# Patient Record
Sex: MALE | Race: BLACK OR AFRICAN AMERICAN | NOT HISPANIC OR LATINO | Employment: FULL TIME | URBAN - METROPOLITAN AREA
[De-identification: names, ages, dates, MRNs, and addresses within clinical notes are randomized per-mention and may not be internally consistent; named-entity substitution may affect disease eponyms.]

---

## 2023-07-03 ENCOUNTER — OFFICE VISIT (OUTPATIENT)
Dept: PRIMARY CARE | Facility: CLINIC | Age: 38
End: 2023-07-03
Payer: COMMERCIAL

## 2023-07-03 VITALS
DIASTOLIC BLOOD PRESSURE: 80 MMHG | HEIGHT: 72 IN | OXYGEN SATURATION: 96 % | HEART RATE: 68 BPM | SYSTOLIC BLOOD PRESSURE: 130 MMHG | WEIGHT: 303 LBS | BODY MASS INDEX: 41.04 KG/M2

## 2023-07-03 DIAGNOSIS — J45.909 MODERATE ASTHMA, UNSPECIFIED WHETHER COMPLICATED, UNSPECIFIED WHETHER PERSISTENT (HHS-HCC): ICD-10-CM

## 2023-07-03 DIAGNOSIS — L30.9 ECZEMA, UNSPECIFIED TYPE: ICD-10-CM

## 2023-07-03 DIAGNOSIS — R20.2 PARESTHESIA: ICD-10-CM

## 2023-07-03 DIAGNOSIS — Z82.49 FAMILY HISTORY OF CHF (CONGESTIVE HEART FAILURE): ICD-10-CM

## 2023-07-03 DIAGNOSIS — Z00.00 ANNUAL PHYSICAL EXAM: Primary | ICD-10-CM

## 2023-07-03 PROBLEM — J30.9 ALLERGIC RHINITIS: Status: ACTIVE | Noted: 2023-07-03

## 2023-07-03 PROCEDURE — 99395 PREV VISIT EST AGE 18-39: CPT | Performed by: INTERNAL MEDICINE

## 2023-07-03 PROCEDURE — 1036F TOBACCO NON-USER: CPT | Performed by: INTERNAL MEDICINE

## 2023-07-03 RX ORDER — MOMETASONE FUROATE AND FORMOTEROL FUMARATE DIHYDRATE 200; 5 UG/1; UG/1
2 AEROSOL RESPIRATORY (INHALATION)
Qty: 13 G | Refills: 2 | Status: SHIPPED | OUTPATIENT
Start: 2023-07-03 | End: 2023-12-01 | Stop reason: SDUPTHER

## 2023-07-03 RX ORDER — MOMETASONE FUROATE AND FORMOTEROL FUMARATE DIHYDRATE 200; 5 UG/1; UG/1
2 AEROSOL RESPIRATORY (INHALATION)
COMMUNITY
Start: 2019-07-01 | End: 2023-07-03 | Stop reason: SDUPTHER

## 2023-07-03 RX ORDER — ALBUTEROL SULFATE 90 UG/1
2 AEROSOL, METERED RESPIRATORY (INHALATION) EVERY 6 HOURS PRN
COMMUNITY
Start: 2018-11-08 | End: 2023-12-01 | Stop reason: SDUPTHER

## 2023-07-03 RX ORDER — TRIAMCINOLONE ACETONIDE 1 MG/G
OINTMENT TOPICAL DAILY PRN
Qty: 15 G | Refills: 0 | Status: SHIPPED | OUTPATIENT
Start: 2023-07-03 | End: 2023-12-01 | Stop reason: SDUPTHER

## 2023-07-03 RX ORDER — IPRATROPIUM BROMIDE AND ALBUTEROL SULFATE 2.5; .5 MG/3ML; MG/3ML
3 SOLUTION RESPIRATORY (INHALATION) EVERY 4 HOURS PRN
COMMUNITY
Start: 2018-08-13 | End: 2023-07-03 | Stop reason: SDUPTHER

## 2023-07-03 RX ORDER — IPRATROPIUM BROMIDE AND ALBUTEROL SULFATE 2.5; .5 MG/3ML; MG/3ML
3 SOLUTION RESPIRATORY (INHALATION) EVERY 4 HOURS PRN
Qty: 180 ML | Refills: 1 | Status: SHIPPED | OUTPATIENT
Start: 2023-07-03 | End: 2023-12-01 | Stop reason: SDUPTHER

## 2023-07-03 ASSESSMENT — PATIENT HEALTH QUESTIONNAIRE - PHQ9
1. LITTLE INTEREST OR PLEASURE IN DOING THINGS: NOT AT ALL
SUM OF ALL RESPONSES TO PHQ9 QUESTIONS 1 AND 2: 0
2. FEELING DOWN, DEPRESSED OR HOPELESS: NOT AT ALL

## 2023-07-03 NOTE — PROGRESS NOTES
"Subjective   Patient ID: Elana Myers is a 37 y.o. male who presents for Annual Exam (Physical).      Review of Systems  Skin rash, hands  Tingling arms    Objective   /80   Pulse 68   Ht 1.831 m (6' 0.08\")   Wt 137 kg (303 lb)   SpO2 96%   BMI 41.01 kg/m²     Physical Exam  NAD. Cooperative.  Skin: scattered erythematous patches, dry, scaly skin, both hands,  HEENT: WNL  Neck: WNL  Lungs CTA  Heart: RRR  Abdomen: WNL  Musculoskeletal system: WNL  Neurologic exam: WNL    Assessment/Plan   Diagnoses and all orders for this visit:  Annual physical exam  -     Lipid panel; Future  -     TSH; Future  -     Hemoglobin A1C; Future  -     Comprehensive metabolic panel; Future  -     CBC; Future  Paresthesia  -     Referral to Neurology; Future  Family history of CHF (congestive heart failure)  -     High sensitivity CRP; Future  Eczema, unspecified type  -     triamcinolone (Kenalog) 0.1 % ointment; Apply topically once daily as needed for irritation or rash.  Moderate asthma, unspecified whether complicated, unspecified whether persistent  -     ipratropium-albuteroL (Duo-Neb) 0.5-2.5 mg/3 mL nebulizer solution; Take 3 mL by nebulization every 4 hours if needed for shortness of breath or wheezing.  -     mometasone-formoterol (Dulera) 200-5 mcg/actuation inhaler; Inhale 2 puffs 2 times a day.  -     Home nebulizer         "

## 2023-12-01 ENCOUNTER — TELEPHONE (OUTPATIENT)
Dept: PRIMARY CARE | Facility: CLINIC | Age: 38
End: 2023-12-01

## 2023-12-01 ENCOUNTER — OFFICE VISIT (OUTPATIENT)
Dept: PRIMARY CARE | Facility: CLINIC | Age: 38
End: 2023-12-01
Payer: COMMERCIAL

## 2023-12-01 VITALS
DIASTOLIC BLOOD PRESSURE: 80 MMHG | BODY MASS INDEX: 38.48 KG/M2 | HEART RATE: 85 BPM | HEIGHT: 74 IN | WEIGHT: 299.8 LBS | SYSTOLIC BLOOD PRESSURE: 130 MMHG | OXYGEN SATURATION: 97 %

## 2023-12-01 DIAGNOSIS — J45.909 UNCOMPLICATED ASTHMA, UNSPECIFIED ASTHMA SEVERITY, UNSPECIFIED WHETHER PERSISTENT (HHS-HCC): Primary | ICD-10-CM

## 2023-12-01 DIAGNOSIS — Z82.49 FAMILY HISTORY OF CHF (CONGESTIVE HEART FAILURE): ICD-10-CM

## 2023-12-01 DIAGNOSIS — D72.829 LEUKOCYTOSIS, UNSPECIFIED TYPE: ICD-10-CM

## 2023-12-01 DIAGNOSIS — L30.9 ECZEMA, UNSPECIFIED TYPE: ICD-10-CM

## 2023-12-01 DIAGNOSIS — Z00.00 ANNUAL PHYSICAL EXAM: ICD-10-CM

## 2023-12-01 DIAGNOSIS — J45.901 EXACERBATION OF ASTHMA, UNSPECIFIED ASTHMA SEVERITY, UNSPECIFIED WHETHER PERSISTENT (HHS-HCC): Primary | ICD-10-CM

## 2023-12-01 DIAGNOSIS — J45.909 MODERATE ASTHMA, UNSPECIFIED WHETHER COMPLICATED, UNSPECIFIED WHETHER PERSISTENT (HHS-HCC): ICD-10-CM

## 2023-12-01 DIAGNOSIS — M65.4 RADIAL STYLOID TENOSYNOVITIS: Primary | ICD-10-CM

## 2023-12-01 PROBLEM — E66.9 OBESITY (BMI 30-39.9): Status: ACTIVE | Noted: 2023-12-01

## 2023-12-01 PROBLEM — Z28.21 INFLUENZA VACCINE REFUSED: Status: ACTIVE | Noted: 2023-12-01

## 2023-12-01 LAB
ALBUMIN SERPL BCP-MCNC: 4.3 G/DL (ref 3.4–5)
ALP SERPL-CCNC: 45 U/L (ref 33–120)
ALT SERPL W P-5'-P-CCNC: 25 U/L (ref 10–52)
ANION GAP SERPL CALC-SCNC: 12 MMOL/L (ref 10–20)
AST SERPL W P-5'-P-CCNC: 18 U/L (ref 9–39)
BILIRUB SERPL-MCNC: 0.4 MG/DL (ref 0–1.2)
BUN SERPL-MCNC: 11 MG/DL (ref 6–23)
CALCIUM SERPL-MCNC: 9.5 MG/DL (ref 8.6–10.6)
CHLORIDE SERPL-SCNC: 105 MMOL/L (ref 98–107)
CHOLEST SERPL-MCNC: 234 MG/DL (ref 0–199)
CHOLESTEROL/HDL RATIO: 4.7
CO2 SERPL-SCNC: 24 MMOL/L (ref 21–32)
CREAT SERPL-MCNC: 1.05 MG/DL (ref 0.5–1.3)
CRP SERPL HS-MCNC: 2.9 MG/L
ERYTHROCYTE [DISTWIDTH] IN BLOOD BY AUTOMATED COUNT: 14.3 % (ref 11.5–14.5)
EST. AVERAGE GLUCOSE BLD GHB EST-MCNC: 105 MG/DL
GFR SERPL CREATININE-BSD FRML MDRD: >90 ML/MIN/1.73M*2
GLUCOSE SERPL-MCNC: 76 MG/DL (ref 74–99)
HBA1C MFR BLD: 5.3 %
HCT VFR BLD AUTO: 45.4 % (ref 41–52)
HDLC SERPL-MCNC: 49.6 MG/DL
HGB BLD-MCNC: 14.8 G/DL (ref 13.5–17.5)
LDLC SERPL CALC-MCNC: 166 MG/DL
MCH RBC QN AUTO: 28.7 PG (ref 26–34)
MCHC RBC AUTO-ENTMCNC: 32.6 G/DL (ref 32–36)
MCV RBC AUTO: 88 FL (ref 80–100)
NON HDL CHOLESTEROL: 184 MG/DL (ref 0–149)
NRBC BLD-RTO: 0 /100 WBCS (ref 0–0)
PLATELET # BLD AUTO: 221 X10*3/UL (ref 150–450)
POTASSIUM SERPL-SCNC: 3.9 MMOL/L (ref 3.5–5.3)
PROT SERPL-MCNC: 6.7 G/DL (ref 6.4–8.2)
RBC # BLD AUTO: 5.16 X10*6/UL (ref 4.5–5.9)
SODIUM SERPL-SCNC: 137 MMOL/L (ref 136–145)
TRIGL SERPL-MCNC: 90 MG/DL (ref 0–149)
TSH SERPL-ACNC: 1.21 MIU/L (ref 0.44–3.98)
VLDL: 18 MG/DL (ref 0–40)
WBC # BLD AUTO: 15.8 X10*3/UL (ref 4.4–11.3)

## 2023-12-01 PROCEDURE — 1036F TOBACCO NON-USER: CPT | Performed by: INTERNAL MEDICINE

## 2023-12-01 PROCEDURE — 80053 COMPREHEN METABOLIC PANEL: CPT

## 2023-12-01 PROCEDURE — 83036 HEMOGLOBIN GLYCOSYLATED A1C: CPT

## 2023-12-01 PROCEDURE — 80061 LIPID PANEL: CPT

## 2023-12-01 PROCEDURE — 85027 COMPLETE CBC AUTOMATED: CPT

## 2023-12-01 PROCEDURE — 36415 COLL VENOUS BLD VENIPUNCTURE: CPT

## 2023-12-01 PROCEDURE — 84443 ASSAY THYROID STIM HORMONE: CPT

## 2023-12-01 PROCEDURE — 86141 C-REACTIVE PROTEIN HS: CPT

## 2023-12-01 PROCEDURE — 99214 OFFICE O/P EST MOD 30 MIN: CPT | Performed by: INTERNAL MEDICINE

## 2023-12-01 RX ORDER — TRIAMCINOLONE ACETONIDE 1 MG/G
OINTMENT TOPICAL DAILY PRN
Qty: 15 G | Refills: 0 | Status: SHIPPED | OUTPATIENT
Start: 2023-12-01 | End: 2024-11-30

## 2023-12-01 RX ORDER — IPRATROPIUM BROMIDE AND ALBUTEROL SULFATE 2.5; .5 MG/3ML; MG/3ML
3 SOLUTION RESPIRATORY (INHALATION) EVERY 4 HOURS PRN
Qty: 180 ML | Refills: 1 | Status: SHIPPED | OUTPATIENT
Start: 2023-12-01

## 2023-12-01 RX ORDER — MOMETASONE FUROATE AND FORMOTEROL FUMARATE DIHYDRATE 200; 5 UG/1; UG/1
2 AEROSOL RESPIRATORY (INHALATION)
Qty: 13 G | Refills: 2 | Status: SHIPPED | OUTPATIENT
Start: 2023-12-01 | End: 2023-12-05 | Stop reason: SDUPTHER

## 2023-12-01 RX ORDER — MELOXICAM 7.5 MG/1
7.51-15 TABLET ORAL DAILY PRN
Qty: 30 TABLET | Refills: 0 | Status: SHIPPED | OUTPATIENT
Start: 2023-12-01

## 2023-12-01 RX ORDER — ALBUTEROL SULFATE 90 UG/1
2 AEROSOL, METERED RESPIRATORY (INHALATION) EVERY 6 HOURS PRN
Qty: 18 G | Refills: 2 | Status: SHIPPED | OUTPATIENT
Start: 2023-12-01

## 2023-12-01 NOTE — TELEPHONE ENCOUNTER
Pt. Is requesting a nebulizer, his old one broke. He will use [facility] for the nebulizer. This pharmacy is now on file.

## 2023-12-01 NOTE — PROGRESS NOTES
"Subjective   Patient ID: JUSTINE Myers is a 38 y.o. male who presents for Med Refill, thumb (Stiffness and pain and swelling), Asthma (Flare-up), and test (Needs blood work).    HPI   C/O right thumb pain for 12 months, right hand dominant, uses the mouse with computer work every day for multiple hours.  Asthma medications refills request.  Review of Systems  Right thumb pain  Objective   Pulse 85   Ht 1.88 m (6' 2\")   Wt 136 kg (299 lb 12.8 oz)   SpO2 97%   BMI 38.49 kg/m²     Physical Exam  NAD. Cooperative.  HEENT: WNL  Lungs CTA  Heart: RRR  Musculoskeletal system: marked point tenderness over the right radial styloid process.  Neurologic exam: WNL    Assessment/Plan   Diagnoses and all orders for this visit:  Radial styloid tenosynovitis  -     meloxicam (Mobic) 7.5 mg tablet; Take 1-2 tablets (7.51-15 mg) by mouth once daily as needed for moderate pain (4 - 6).  Moderate asthma, unspecified whether complicated, unspecified whether persistent  -     ipratropium-albuteroL (Duo-Neb) 0.5-2.5 mg/3 mL nebulizer solution; Take 3 mL by nebulization every 4 hours if needed for shortness of breath or wheezing.  -     mometasone-formoterol (Dulera) 200-5 mcg/actuation inhaler; Inhale 2 puffs 2 times a day.  -     albuterol (ProAir HFA) 90 mcg/actuation inhaler; Inhale 2 puffs every 6 hours if needed for wheezing or shortness of breath.  Eczema, unspecified type  -     triamcinolone (Kenalog) 0.1 % ointment; Apply topically once daily as needed for irritation or rash.  Immobilization with thumb guard, icing 15 minutes per hour, as frequently as possible  Influenza vaccine was discussed and recommended, the patient declined.  Lab ordered in 7/23.     "

## 2023-12-05 ENCOUNTER — OFFICE VISIT (OUTPATIENT)
Dept: NEUROLOGY | Facility: CLINIC | Age: 38
End: 2023-12-05
Payer: COMMERCIAL

## 2023-12-05 ENCOUNTER — ANCILLARY PROCEDURE (OUTPATIENT)
Dept: RADIOLOGY | Facility: CLINIC | Age: 38
End: 2023-12-05
Payer: COMMERCIAL

## 2023-12-05 VITALS
SYSTOLIC BLOOD PRESSURE: 148 MMHG | TEMPERATURE: 97.7 F | WEIGHT: 311 LBS | HEART RATE: 74 BPM | DIASTOLIC BLOOD PRESSURE: 77 MMHG | HEIGHT: 74 IN | BODY MASS INDEX: 39.91 KG/M2

## 2023-12-05 DIAGNOSIS — J45.901 EXACERBATION OF ASTHMA, UNSPECIFIED ASTHMA SEVERITY, UNSPECIFIED WHETHER PERSISTENT (HHS-HCC): ICD-10-CM

## 2023-12-05 DIAGNOSIS — J45.909 MODERATE ASTHMA, UNSPECIFIED WHETHER COMPLICATED, UNSPECIFIED WHETHER PERSISTENT (HHS-HCC): ICD-10-CM

## 2023-12-05 DIAGNOSIS — M25.511 CHRONIC RIGHT SHOULDER PAIN: ICD-10-CM

## 2023-12-05 DIAGNOSIS — G56.23 ULNAR NEUROPATHY OF BOTH UPPER EXTREMITIES: Primary | ICD-10-CM

## 2023-12-05 DIAGNOSIS — R20.2 PARESTHESIA: ICD-10-CM

## 2023-12-05 DIAGNOSIS — G89.29 CHRONIC RIGHT SHOULDER PAIN: ICD-10-CM

## 2023-12-05 DIAGNOSIS — G57.00 SCIATIC NEUROPATHY, UNSPECIFIED LATERALITY: ICD-10-CM

## 2023-12-05 PROCEDURE — 99205 OFFICE O/P NEW HI 60 MIN: CPT | Performed by: PSYCHIATRY & NEUROLOGY

## 2023-12-05 PROCEDURE — 85025 COMPLETE CBC W/AUTO DIFF WBC: CPT

## 2023-12-05 PROCEDURE — 71046 X-RAY EXAM CHEST 2 VIEWS: CPT | Performed by: RADIOLOGY

## 2023-12-05 PROCEDURE — 71046 X-RAY EXAM CHEST 2 VIEWS: CPT | Mod: FY

## 2023-12-05 PROCEDURE — 1036F TOBACCO NON-USER: CPT | Performed by: PSYCHIATRY & NEUROLOGY

## 2023-12-05 RX ORDER — MOMETASONE FUROATE AND FORMOTEROL FUMARATE DIHYDRATE 200; 5 UG/1; UG/1
2 AEROSOL RESPIRATORY (INHALATION)
Qty: 13 G | Refills: 2 | Status: SHIPPED | OUTPATIENT
Start: 2023-12-05 | End: 2024-12-04

## 2023-12-05 NOTE — PROGRESS NOTES
NEUROLOGY OUTPATIENT INITIAL VISIT NOTE    Assessment/Plan   Diagnoses and all orders for this visit:  Ulnar neuropathy of both upper extremities  Sciatic neuropathy, unspecified laterality  Chronic right shoulder pain      IMPRESSION:  Bilateral mild ulnar neuropathy by symptoms, though no exam findings at this time.  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  Non-neurological right shoulder pain.    PLAN:  Advised to change his workspace for better elbow support, padding on chair, and regular standing breaks while at work.  Referred back to PCP for shoulder issue.  I am happy to see him again as needed or as requested.    Estrada Flynn Jr., M.D., FAAN       --------      Subjective     Elana Myers is a 38 y.o. year old, right-handed male referred by Dr. Ellison for tingling and shoulder pain.    HPI  He reports about a year of tingling of the elbows to the ring and pinky fingers bilaterally,  and on both posterior thighs.  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  He places the arms and elbows on the arm rests.  These issues can appear separately.  He denies other focal neurological symptoms including dysarthria, dysphagia, diplopia, focal weakness, other focal sensory change, ataxia, vertigo, or bowel/bladder incontinence, among others.      He reports nine years of right shoulder discomfort.  It is sharp pain radiating from the medial posterior shoulder down and around the shoulder blade.  It only appears if he is pulling something from above him while exercising.    Review of Systems   All other systems reviewed and are negative.      Patient Active Problem List   Diagnosis    Allergic rhinitis    Asthma    Obesity (BMI 30-39.9)    Influenza vaccine refused     Past Medical History:   Diagnosis Date    Personal history of nicotine dependence 10/05/2020    History of nicotine dependence     No past surgical history on file.  Social History     Tobacco  "Use    Smoking status: Never    Smokeless tobacco: Never   Substance Use Topics    Alcohol use: Yes     Comment: 2-3 drinks per month     family history is not on file.    Current Outpatient Medications:     albuterol (ProAir HFA) 90 mcg/actuation inhaler, Inhale 2 puffs every 6 hours if needed for wheezing or shortness of breath., Disp: 18 g, Rfl: 2    ipratropium-albuteroL (Duo-Neb) 0.5-2.5 mg/3 mL nebulizer solution, Take 3 mL by nebulization every 4 hours if needed for shortness of breath or wheezing., Disp: 180 mL, Rfl: 1    meloxicam (Mobic) 7.5 mg tablet, Take 1-2 tablets (7.51-15 mg) by mouth once daily as needed for moderate pain (4 - 6)., Disp: 30 tablet, Rfl: 0    mometasone-formoterol (Dulera) 200-5 mcg/actuation inhaler, Inhale 2 puffs 2 times a day., Disp: 13 g, Rfl: 2    triamcinolone (Kenalog) 0.1 % ointment, Apply topically once daily as needed for irritation or rash., Disp: 15 g, Rfl: 0  Allergies   Allergen Reactions    Penicillins Anaphylaxis    Sulfa (Sulfonamide Antibiotics) Anaphylaxis and Hives       Objective     /77   Pulse 74   Temp 36.5 °C (97.7 °F)   Ht 1.88 m (6' 2\")   Wt 141 kg (311 lb)   BMI 39.93 kg/m²     CONSTITUTIONAL:  No acute distress    EXTREMITIES:  No right shoulder tenderness, full range of motion    CARDIOVASCULAR:  Normal pulses in the distal legs, no edema of either arm or either leg.  No carotid bruits.    MENTAL STATUS:  Awake, alert, fully oriented to self, place, and time, with present short-term memory, good awareness of recent events, normal attention span, concentration, and fund of knowledge.    SPEECH AND LANGUAGE:  Can name and repeat, follows all commands, has no dysarthria    FUNDOSCOPIC:  No papilledema    CRANIAL NERVES:  II-Vision present, visual fields full to confrontational testing    III/IV/VI--EOMs are present in all directions.  Pupils are symmetrically reactive in dim light.  No ptosis.    V--Normal facial sensation.    VII--No facial " asymmetry.    VIII--Hearing present to voice bilaterally.    IX/X--Symmetric soft palate rise.    XI--Normal trapezius power bilaterally.    XII--Tongue protrudes without deviation.    MOTOR:  Normal power, tone, and bulk in both arms and both legs.  Specifically, no atrophy of the shoulder muscles on either side.    SENSORY:  Normal pin sensation in both arms and both legs without distal-proximal gradient, asymmetry, or spinal sensory level.  No Tinel's at either wrist or either elbow.    COORDINATION:  Normal finger-to-nose and heel-to-shin testing in both arms and both legs.    REFLEXES are normal and symmetric at the biceps, triceps, brachioradialis, patella, and ankle.  The plantar responses are flexor.    GAIT is normal, without steppage, ataxia, shuffling, or spasticity.        Estrada Flynn Jr., M.D., [facility]

## 2023-12-05 NOTE — LETTER
December 5, 2023     Marylu Ellison MD  [address]  [address]  [facility] [address]    Patient: JUSTINE Myers   YOB: 1985   Date of Visit: 12/5/2023       Dear Dr. Marylu Ellison MD:    Thank you for referring JUSTINE Myers to me for evaluation. Below are my notes for this consultation.  If you have questions, please do not hesitate to call me. I look forward to following your patient along with you.       Sincerely,     Estrada Flynn Jr., M.D., FAAN       CC: No Recipients  ______________________________________________________________________________________    NEUROLOGY OUTPATIENT INITIAL VISIT NOTE    Assessment/Plan  Diagnoses and all orders for this visit:  Ulnar neuropathy of both upper extremities  Sciatic neuropathy, unspecified laterality  Chronic right shoulder pain      IMPRESSION:  Bilateral mild ulnar neuropathy by symptoms, though no exam findings at this time.  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  Non-neurological right shoulder pain.    PLAN:  Advised to change his workspace for better elbow support, padding on chair, and regular standing breaks while at work.  Referred back to PCP for shoulder issue.  I am happy to see him again as needed or as requested.    Estrada Flynn Jr., M.D., FAAN       --------      Subjective    Elana Myers is a 38 y.o. year old, right-handed male referred by Dr. Ellison for tingling and shoulder pain.    HPI  He reports about a year of tingling of the elbows to the ring and pinky fingers bilaterally,  and on both posterior thighs.  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  He places the arms and elbows on the arm rests.  These issues can appear separately.  He denies other focal neurological symptoms including dysarthria, dysphagia, diplopia, focal weakness, other focal sensory change, ataxia, vertigo, or bowel/bladder incontinence, among others.      He reports nine years of  "right shoulder discomfort.  It is sharp pain radiating from the medial posterior shoulder down and around the shoulder blade.  It only appears if he is pulling something from above him while exercising.    Review of Systems   All other systems reviewed and are negative.      Patient Active Problem List   Diagnosis   • Allergic rhinitis   • Asthma   • Obesity (BMI 30-39.9)   • Influenza vaccine refused     Past Medical History:   Diagnosis Date   • Personal history of nicotine dependence 10/05/2020    History of nicotine dependence     No past surgical history on file.  Social History     Tobacco Use   • Smoking status: Never   • Smokeless tobacco: Never   Substance Use Topics   • Alcohol use: Yes     Comment: 2-3 drinks per month     family history is not on file.    Current Outpatient Medications:   •  albuterol (ProAir HFA) 90 mcg/actuation inhaler, Inhale 2 puffs every 6 hours if needed for wheezing or shortness of breath., Disp: 18 g, Rfl: 2  •  ipratropium-albuteroL (Duo-Neb) 0.5-2.5 mg/3 mL nebulizer solution, Take 3 mL by nebulization every 4 hours if needed for shortness of breath or wheezing., Disp: 180 mL, Rfl: 1  •  meloxicam (Mobic) 7.5 mg tablet, Take 1-2 tablets (7.51-15 mg) by mouth once daily as needed for moderate pain (4 - 6)., Disp: 30 tablet, Rfl: 0  •  mometasone-formoterol (Dulera) 200-5 mcg/actuation inhaler, Inhale 2 puffs 2 times a day., Disp: 13 g, Rfl: 2  •  triamcinolone (Kenalog) 0.1 % ointment, Apply topically once daily as needed for irritation or rash., Disp: 15 g, Rfl: 0  Allergies   Allergen Reactions   • Penicillins Anaphylaxis   • Sulfa (Sulfonamide Antibiotics) Anaphylaxis and Hives       Objective    /77   Pulse 74   Temp 36.5 °C (97.7 °F)   Ht 1.88 m (6' 2\")   Wt 141 kg (311 lb)   BMI 39.93 kg/m²     CONSTITUTIONAL:  No acute distress    EXTREMITIES:  No right shoulder tenderness, full range of motion    CARDIOVASCULAR:  Normal pulses in the distal legs, no edema of " either arm or either leg.  No carotid bruits.    MENTAL STATUS:  Awake, alert, fully oriented to self, place, and time, with present short-term memory, good awareness of recent events, normal attention span, concentration, and fund of knowledge.    SPEECH AND LANGUAGE:  Can name and repeat, follows all commands, has no dysarthria    FUNDOSCOPIC:  No papilledema    CRANIAL NERVES:  II-Vision present, visual fields full to confrontational testing    III/IV/VI--EOMs are present in all directions.  Pupils are symmetrically reactive in dim light.  No ptosis.    V--Normal facial sensation.    VII--No facial asymmetry.    VIII--Hearing present to voice bilaterally.    IX/X--Symmetric soft palate rise.    XI--Normal trapezius power bilaterally.    XII--Tongue protrudes without deviation.    MOTOR:  Normal power, tone, and bulk in both arms and both legs.  Specifically, no atrophy of the shoulder muscles on either side.    SENSORY:  Normal pin sensation in both arms and both legs without distal-proximal gradient, asymmetry, or spinal sensory level.  No Tinel's at either wrist or either elbow.    COORDINATION:  Normal finger-to-nose and heel-to-shin testing in both arms and both legs.    REFLEXES are normal and symmetric at the biceps, triceps, brachioradialis, patella, and ankle.  The plantar responses are flexor.    GAIT is normal, without steppage, ataxia, shuffling, or spasticity.        Estrada Flynn Jr., M.D., FAAN

## 2023-12-07 ENCOUNTER — TELEPHONE (OUTPATIENT)
Dept: PRIMARY CARE | Facility: CLINIC | Age: 38
End: 2023-12-07
Payer: COMMERCIAL

## 2023-12-07 DIAGNOSIS — G89.29 CHRONIC RIGHT SHOULDER PAIN: Primary | ICD-10-CM

## 2023-12-07 DIAGNOSIS — M25.511 CHRONIC RIGHT SHOULDER PAIN: Primary | ICD-10-CM

## 2024-08-13 ENCOUNTER — APPOINTMENT (OUTPATIENT)
Dept: RADIOLOGY | Facility: HOSPITAL | Age: 39
End: 2024-08-13
Payer: COMMERCIAL

## 2024-08-13 ENCOUNTER — HOSPITAL ENCOUNTER (EMERGENCY)
Facility: HOSPITAL | Age: 39
Discharge: HOME | End: 2024-08-13
Attending: EMERGENCY MEDICINE
Payer: COMMERCIAL

## 2024-08-13 VITALS
WEIGHT: 300 LBS | HEIGHT: 75 IN | OXYGEN SATURATION: 100 % | TEMPERATURE: 98.4 F | DIASTOLIC BLOOD PRESSURE: 70 MMHG | HEART RATE: 67 BPM | SYSTOLIC BLOOD PRESSURE: 107 MMHG | BODY MASS INDEX: 37.3 KG/M2 | RESPIRATION RATE: 18 BRPM

## 2024-08-13 DIAGNOSIS — A08.4 VIRAL GASTROENTERITIS: Primary | ICD-10-CM

## 2024-08-13 LAB
ALBUMIN SERPL BCP-MCNC: 4.2 G/DL (ref 3.4–5)
ALP SERPL-CCNC: 48 U/L (ref 33–120)
ALT SERPL W P-5'-P-CCNC: 20 U/L (ref 10–52)
ANION GAP BLDV CALCULATED.4IONS-SCNC: 8 MMOL/L (ref 10–25)
ANION GAP SERPL CALC-SCNC: 14 MMOL/L (ref 10–20)
APPEARANCE UR: CLEAR
AST SERPL W P-5'-P-CCNC: 19 U/L (ref 9–39)
BASE EXCESS BLDV CALC-SCNC: -0.1 MMOL/L (ref -2–3)
BASOPHILS # BLD AUTO: 0.02 X10*3/UL (ref 0–0.1)
BASOPHILS NFR BLD AUTO: 0.3 %
BILIRUB DIRECT SERPL-MCNC: 0.1 MG/DL (ref 0–0.3)
BILIRUB SERPL-MCNC: 0.5 MG/DL (ref 0–1.2)
BILIRUB UR STRIP.AUTO-MCNC: NEGATIVE MG/DL
BODY TEMPERATURE: 37 DEGREES CELSIUS
BUN SERPL-MCNC: 12 MG/DL (ref 6–23)
CA-I BLDV-SCNC: 1.2 MMOL/L (ref 1.1–1.33)
CALCIUM SERPL-MCNC: 8.8 MG/DL (ref 8.6–10.3)
CHLORIDE BLDV-SCNC: 105 MMOL/L (ref 98–107)
CHLORIDE SERPL-SCNC: 106 MMOL/L (ref 98–107)
CO2 SERPL-SCNC: 20 MMOL/L (ref 21–32)
COLOR UR: NORMAL
CREAT SERPL-MCNC: 1.14 MG/DL (ref 0.5–1.3)
EGFRCR SERPLBLD CKD-EPI 2021: 84 ML/MIN/1.73M*2
EOSINOPHIL # BLD AUTO: 0.03 X10*3/UL (ref 0–0.7)
EOSINOPHIL NFR BLD AUTO: 0.5 %
ERYTHROCYTE [DISTWIDTH] IN BLOOD BY AUTOMATED COUNT: 13.6 % (ref 11.5–14.5)
GLUCOSE BLDV-MCNC: 108 MG/DL (ref 74–99)
GLUCOSE SERPL-MCNC: 107 MG/DL (ref 74–99)
GLUCOSE UR STRIP.AUTO-MCNC: NORMAL MG/DL
HCO3 BLDV-SCNC: 22.6 MMOL/L (ref 22–26)
HCT VFR BLD AUTO: 45.2 % (ref 41–52)
HCT VFR BLD EST: 47 % (ref 41–52)
HGB BLD-MCNC: 15.6 G/DL (ref 13.5–17.5)
HGB BLDV-MCNC: 15.8 G/DL (ref 13.5–17.5)
IMM GRANULOCYTES # BLD AUTO: 0.01 X10*3/UL (ref 0–0.7)
IMM GRANULOCYTES NFR BLD AUTO: 0.2 % (ref 0–0.9)
INHALED O2 CONCENTRATION: 21 %
KETONES UR STRIP.AUTO-MCNC: NEGATIVE MG/DL
LACTATE BLDV-SCNC: 1.8 MMOL/L (ref 0.4–2)
LACTATE SERPL-SCNC: 1.3 MMOL/L (ref 0.4–2)
LEUKOCYTE ESTERASE UR QL STRIP.AUTO: NEGATIVE
LIPASE SERPL-CCNC: 57 U/L (ref 9–82)
LYMPHOCYTES # BLD AUTO: 1.58 X10*3/UL (ref 1.2–4.8)
LYMPHOCYTES NFR BLD AUTO: 25.5 %
MCH RBC QN AUTO: 29 PG (ref 26–34)
MCHC RBC AUTO-ENTMCNC: 34.5 G/DL (ref 32–36)
MCV RBC AUTO: 84 FL (ref 80–100)
MONOCYTES # BLD AUTO: 0.45 X10*3/UL (ref 0.1–1)
MONOCYTES NFR BLD AUTO: 7.3 %
NEUTROPHILS # BLD AUTO: 4.1 X10*3/UL (ref 1.2–7.7)
NEUTROPHILS NFR BLD AUTO: 66.2 %
NITRITE UR QL STRIP.AUTO: NEGATIVE
NRBC BLD-RTO: 0 /100 WBCS (ref 0–0)
OXYHGB MFR BLDV: 97.3 % (ref 45–75)
PCO2 BLDV: 31 MM HG (ref 41–51)
PH BLDV: 7.47 PH (ref 7.33–7.43)
PH UR STRIP.AUTO: 7 [PH]
PLATELET # BLD AUTO: 226 X10*3/UL (ref 150–450)
PO2 BLDV: 108 MM HG (ref 35–45)
POTASSIUM BLDV-SCNC: 3.5 MMOL/L (ref 3.5–5.3)
POTASSIUM SERPL-SCNC: 3.6 MMOL/L (ref 3.5–5.3)
PROT SERPL-MCNC: 6.8 G/DL (ref 6.4–8.2)
PROT UR STRIP.AUTO-MCNC: NEGATIVE MG/DL
RBC # BLD AUTO: 5.38 X10*6/UL (ref 4.5–5.9)
RBC # UR STRIP.AUTO: NEGATIVE /UL
SAO2 % BLDV: 99 % (ref 45–75)
SODIUM BLDV-SCNC: 132 MMOL/L (ref 136–145)
SODIUM SERPL-SCNC: 136 MMOL/L (ref 136–145)
SP GR UR STRIP.AUTO: 1.02
UROBILINOGEN UR STRIP.AUTO-MCNC: NORMAL MG/DL
WBC # BLD AUTO: 6.2 X10*3/UL (ref 4.4–11.3)

## 2024-08-13 PROCEDURE — 74177 CT ABD & PELVIS W/CONTRAST: CPT

## 2024-08-13 PROCEDURE — 81003 URINALYSIS AUTO W/O SCOPE: CPT | Performed by: EMERGENCY MEDICINE

## 2024-08-13 PROCEDURE — 96374 THER/PROPH/DIAG INJ IV PUSH: CPT | Performed by: EMERGENCY MEDICINE

## 2024-08-13 PROCEDURE — 74177 CT ABD & PELVIS W/CONTRAST: CPT | Performed by: RADIOLOGY

## 2024-08-13 PROCEDURE — 36415 COLL VENOUS BLD VENIPUNCTURE: CPT | Performed by: EMERGENCY MEDICINE

## 2024-08-13 PROCEDURE — 85025 COMPLETE CBC W/AUTO DIFF WBC: CPT | Performed by: EMERGENCY MEDICINE

## 2024-08-13 PROCEDURE — 96360 HYDRATION IV INFUSION INIT: CPT | Performed by: EMERGENCY MEDICINE

## 2024-08-13 PROCEDURE — 2500000004 HC RX 250 GENERAL PHARMACY W/ HCPCS (ALT 636 FOR OP/ED): Performed by: EMERGENCY MEDICINE

## 2024-08-13 PROCEDURE — 83690 ASSAY OF LIPASE: CPT | Performed by: EMERGENCY MEDICINE

## 2024-08-13 PROCEDURE — 99284 EMERGENCY DEPT VISIT MOD MDM: CPT | Mod: 25 | Performed by: EMERGENCY MEDICINE

## 2024-08-13 PROCEDURE — 83605 ASSAY OF LACTIC ACID: CPT | Mod: 91 | Performed by: EMERGENCY MEDICINE

## 2024-08-13 PROCEDURE — 84132 ASSAY OF SERUM POTASSIUM: CPT | Performed by: EMERGENCY MEDICINE

## 2024-08-13 PROCEDURE — 82248 BILIRUBIN DIRECT: CPT | Performed by: EMERGENCY MEDICINE

## 2024-08-13 PROCEDURE — 96361 HYDRATE IV INFUSION ADD-ON: CPT | Performed by: EMERGENCY MEDICINE

## 2024-08-13 PROCEDURE — 96375 TX/PRO/DX INJ NEW DRUG ADDON: CPT | Performed by: EMERGENCY MEDICINE

## 2024-08-13 PROCEDURE — 84132 ASSAY OF SERUM POTASSIUM: CPT | Mod: 59 | Performed by: EMERGENCY MEDICINE

## 2024-08-13 PROCEDURE — 2550000001 HC RX 255 CONTRASTS: Performed by: EMERGENCY MEDICINE

## 2024-08-13 RX ORDER — ONDANSETRON 4 MG/1
4 TABLET, ORALLY DISINTEGRATING ORAL EVERY 8 HOURS PRN
Qty: 15 TABLET | Refills: 0 | Status: SHIPPED | OUTPATIENT
Start: 2024-08-13

## 2024-08-13 RX ORDER — DICYCLOMINE HYDROCHLORIDE 20 MG/1
20 TABLET ORAL 2 TIMES DAILY
Qty: 20 TABLET | Refills: 0 | Status: SHIPPED | OUTPATIENT
Start: 2024-08-13 | End: 2024-08-23

## 2024-08-13 RX ORDER — ONDANSETRON HYDROCHLORIDE 2 MG/ML
4 INJECTION, SOLUTION INTRAVENOUS ONCE
Status: COMPLETED | OUTPATIENT
Start: 2024-08-13 | End: 2024-08-13

## 2024-08-13 RX ORDER — KETOROLAC TROMETHAMINE 30 MG/ML
30 INJECTION, SOLUTION INTRAMUSCULAR; INTRAVENOUS ONCE
Status: COMPLETED | OUTPATIENT
Start: 2024-08-13 | End: 2024-08-13

## 2024-08-13 ASSESSMENT — PAIN DESCRIPTION - LOCATION: LOCATION: ABDOMEN

## 2024-08-13 ASSESSMENT — COLUMBIA-SUICIDE SEVERITY RATING SCALE - C-SSRS
2. HAVE YOU ACTUALLY HAD ANY THOUGHTS OF KILLING YOURSELF?: NO
6. HAVE YOU EVER DONE ANYTHING, STARTED TO DO ANYTHING, OR PREPARED TO DO ANYTHING TO END YOUR LIFE?: NO
1. IN THE PAST MONTH, HAVE YOU WISHED YOU WERE DEAD OR WISHED YOU COULD GO TO SLEEP AND NOT WAKE UP?: NO

## 2024-08-13 ASSESSMENT — PAIN DESCRIPTION - ORIENTATION: ORIENTATION: MID;UPPER

## 2024-08-13 ASSESSMENT — PAIN - FUNCTIONAL ASSESSMENT: PAIN_FUNCTIONAL_ASSESSMENT: 0-10

## 2024-08-13 ASSESSMENT — PAIN SCALES - GENERAL: PAINLEVEL_OUTOF10: 10 - WORST POSSIBLE PAIN

## 2024-08-13 NOTE — ED PROVIDER NOTES
HPI   Chief Complaint   Patient presents with    Abdominal Pain       HPI: []  39-year-old  male who has no medical history comes in with abdominal pain vomiting diarrhea for the last 5 days.  He states he had some bad food 5 days ago for since that time he has not diarrhea.  About 10 BMs every day.  He vomited once today.  He has abdominal pain which is diffuse.  Cramp-like nature.  He denies hematemesis melena hematochezia no hemoptysis he denied recent travel or hospitalization no antibiotic use.  He denies sick contacts.    Past history: None  Social: Patient denies  current tobacco alcohol drug abuse.  REVIEW OF SYSTEMS:    GENERAL.: No weight loss, fatigue, anorexia, insomnia, fever.    EYES: No vision loss, double vision, drainage, eye pain.    ENT: No pharyngitis, dry mouth.    CARDIOPULMONARY: No chest pain, palpitations, syncope, near syncope. No shortness of breath, cough, hemoptysis.    GI: Positive for abdominal pain, change in bowel habits, melena, hematemesis, hematochezia, nausea, positive for vomiting, positive for diarrhea.    : No discharge, dysuria, frequency, urgency, hematuria.    MS: No limb pain, joint pain, joint swelling.    SKIN: No rashes.    PSYCH: No depression, anxiety, suicidality, homicidality.    Review of systems is otherwise negative unless stated above or in history of present illness.  Social history, family history, allergies reviewed.  PHYSICAL EXAM:    GENERAL: Vitals noted, no distress. Alert and oriented  x 3. Non-toxic.  Appears uncomfortable    EENT: TMs clear. Posterior oropharynx unremarkable. No meningismus. No LAD.     NECK: Supple. Nontender. No midline tenderness.     CARDIAC: Regular, rate, rhythm. No murmurs rubs or gallops. No JVD    PULMONARY: Lungs clear bilaterally with good aeration. No wheezes rales or rhonchi. No respiratory distress.     ABDOMEN: Soft, nonsurgical.  Minimal diffuse tenderness no rebound or guarding negative Low sign negative  McBurney point tenderness no CVA tenderness no inguinal hernias.  Very benign nonsurgical abdomen.  No peritoneal signs. Normoactive bowel sounds. No pulsatile masses.     EXTREMITIES: No peripheral edema. Negative Homans bilaterally, no cords.  2+ bounding pulses well-perfused.    SKIN: No rash. Intact.     NEURO: No focal neurologic deficits, NIH score of 0. Cranial nerves normal as tested from II through XII.     MEDICAL DECISION MAKING:  CBC with differential shows no leukocytosis stable hemoglobin chemistry is unremarkable sodium bicarbonate 20 which is low but anion gap is normal, UA shows no ketones no infection lipase normal LFTs normal lactate normal.  Abdominal CAT scan on my interpretation I do not see any obvious pathology I do not see any diverticulitis appendicitis any bowel obstruction or any evidence of colitis.  Awaiting read by radiology.    Treatment in ED: IV established given IV ketorolac Zofran and 2 L of LR.    ED course: Repeat assessment feeling much better passed a p.o. challenge enmanuel afebrile normotensive no tachycardia hypoxia, repeat exam is benign nonlocalizing.    Impression: #1 acute gastroenteritis most likely viral    Plan/MDM: 39-year-old  male comes in with what appears to be acute gastroenteritis most active viral versus foodborne illness, low concern for bowel obstruction diverticulitis appendicitis acute emergency colitis or endocrine pathology patient appears well he appears well-hydrated he passed a p.o. challenge, anticipate discharge home as long as radiology agrees with my assessment of the abdominal CT with a bland diet and Bentyl and Zofran close outpatient follow-up with strict and return precautions.              Patient History   Past Medical History:   Diagnosis Date    Personal history of nicotine dependence 10/05/2020    History of nicotine dependence     No past surgical history on file.  No family history on file.  Social History     Tobacco Use     Smoking status: Never    Smokeless tobacco: Never   Substance Use Topics    Alcohol use: Yes     Comment: 2-3 drinks per month    Drug use: Never       Physical Exam   ED Triage Vitals   Temperature Heart Rate Respirations BP   08/13/24 0225 08/13/24 0227 08/13/24 0225 08/13/24 0225   36.9 °C (98.4 °F) 71 18 130/78      Pulse Ox Temp Source Heart Rate Source Patient Position   08/13/24 0225 08/13/24 0225 -- --   100 % Oral        BP Location FiO2 (%)     -- --             Physical Exam      ED Course & [facility]   ED Course as of 09/14/24 2337   Tue Aug 13, 2024   0613 Patient CBC with differential shows no leukocytosis, his chemistry is fairly unremarkable sodium bicarb and is low 20, lipase normal lactate normal LFTs unremarkable UA negative no ketones abdominal CAT scan on my interpretation I do not see any obvious pathology no bowel obstruction and/or any diverticulitis or colitis, patient received IV fluids to liters of LR intravenous ketorolac and Zofran feels much better anticipate as long as CT scan is read negative by radiology be discharged home with a bland diet Zofran ODT Bentyl as needed close outpatient follow-up with strict return precautions. [MT]   0710 Patient abdominal CAT scan findings consistent with enteritis he has some mesenteric adenitis, but no obvious colitis identified.  Please refer to the radiology report.  Patient made aware of these findings will be discharged home as per original plan. [MT]      ED Course User Index  [MT] Zuleima Jackson MD         Diagnoses as of 09/14/24 2337   Viral gastroenteritis                 No data recorded                                 Medical Decision Making      Procedure  Procedures     Zuleima Jackson MD  08/13/24 0617       Zuleima Jackson MD  09/14/24 2337

## 2024-08-13 NOTE — ED TRIAGE NOTES
Pt bib ems for c/o abdominal pain x 4 days, worsening today. Reports one episode of vomiting as well as diarrhea

## 2025-01-02 ENCOUNTER — LAB (OUTPATIENT)
Dept: LAB | Facility: LAB | Age: 40
End: 2025-01-02
Payer: COMMERCIAL

## 2025-01-02 ENCOUNTER — APPOINTMENT (OUTPATIENT)
Dept: PRIMARY CARE | Facility: CLINIC | Age: 40
End: 2025-01-02
Payer: COMMERCIAL

## 2025-01-02 VITALS
DIASTOLIC BLOOD PRESSURE: 79 MMHG | OXYGEN SATURATION: 97 % | SYSTOLIC BLOOD PRESSURE: 120 MMHG | BODY MASS INDEX: 38.92 KG/M2 | HEIGHT: 75 IN | HEART RATE: 90 BPM | WEIGHT: 313 LBS

## 2025-01-02 DIAGNOSIS — Z00.00 ANNUAL PHYSICAL EXAM: ICD-10-CM

## 2025-01-02 DIAGNOSIS — G89.29 OTHER CHRONIC BACK PAIN: ICD-10-CM

## 2025-01-02 DIAGNOSIS — M54.89 OTHER CHRONIC BACK PAIN: ICD-10-CM

## 2025-01-02 DIAGNOSIS — E66.9 OBESITY (BMI 30-39.9): ICD-10-CM

## 2025-01-02 DIAGNOSIS — Z00.00 ANNUAL PHYSICAL EXAM: Primary | ICD-10-CM

## 2025-01-02 DIAGNOSIS — J45.909 MODERATE ASTHMA, UNSPECIFIED WHETHER COMPLICATED, UNSPECIFIED WHETHER PERSISTENT (HHS-HCC): ICD-10-CM

## 2025-01-02 LAB
CHOLEST SERPL-MCNC: 238 MG/DL (ref 0–199)
CHOLESTEROL/HDL RATIO: 5.8
EST. AVERAGE GLUCOSE BLD GHB EST-MCNC: 108 MG/DL
HBA1C MFR BLD: 5.4 %
HDLC SERPL-MCNC: 40.7 MG/DL
LDLC SERPL CALC-MCNC: 148 MG/DL
NON HDL CHOLESTEROL: 197 MG/DL (ref 0–149)
TRIGL SERPL-MCNC: 249 MG/DL (ref 0–149)
TSH SERPL-ACNC: 1.61 MIU/L (ref 0.44–3.98)
VLDL: 50 MG/DL (ref 0–40)

## 2025-01-02 PROCEDURE — 80061 LIPID PANEL: CPT

## 2025-01-02 PROCEDURE — 1036F TOBACCO NON-USER: CPT | Performed by: INTERNAL MEDICINE

## 2025-01-02 PROCEDURE — 83036 HEMOGLOBIN GLYCOSYLATED A1C: CPT

## 2025-01-02 PROCEDURE — 84443 ASSAY THYROID STIM HORMONE: CPT

## 2025-01-02 PROCEDURE — 99395 PREV VISIT EST AGE 18-39: CPT | Performed by: INTERNAL MEDICINE

## 2025-01-02 PROCEDURE — 3008F BODY MASS INDEX DOCD: CPT | Performed by: INTERNAL MEDICINE

## 2025-01-02 RX ORDER — ALBUTEROL SULFATE 90 UG/1
2 INHALANT RESPIRATORY (INHALATION) EVERY 6 HOURS PRN
Qty: 18 G | Refills: 3 | Status: SHIPPED | OUTPATIENT
Start: 2025-01-02

## 2025-01-02 RX ORDER — MOMETASONE FUROATE AND FORMOTEROL FUMARATE DIHYDRATE 200; 5 UG/1; UG/1
2 AEROSOL RESPIRATORY (INHALATION)
Qty: 13 G | Refills: 3 | Status: SHIPPED | OUTPATIENT
Start: 2025-01-02

## 2025-01-02 ASSESSMENT — PATIENT HEALTH QUESTIONNAIRE - PHQ9
2. FEELING DOWN, DEPRESSED OR HOPELESS: NOT AT ALL
1. LITTLE INTEREST OR PLEASURE IN DOING THINGS: NOT AT ALL
SUM OF ALL RESPONSES TO PHQ9 QUESTIONS 1 AND 2: 0

## 2025-01-02 NOTE — PROGRESS NOTES
"Subjective   Patient ID: JUSTINE Myers is a 39 y.o. male who presents for Annual Exam (Patient here for complete physical exam).    HPI   The patient presents for an annual wellness exam.    Review of Systems  Weight gain  Low back pain     Objective   /79   Pulse 90   Ht 1.905 m (6' 3\")   Wt 142 kg (313 lb)   SpO2 97%   BMI 39.12 kg/m²     Physical Exam  NAD. Cooperative.  HEENT: WNL  Neck: WNL  Lungs CTA  Heart: RRR  Abdomen: WNL  Musculoskeletal system: WNL  Neurologic exam: WNL    Assessment/Plan   Diagnoses and all orders for this visit:  Annual physical exam  -     Hemoglobin A1C; Future  -     Lipid panel; Future  -     TSH; Future  Moderate asthma, unspecified whether complicated, unspecified whether persistent ([facility])  -     albuterol (ProAir HFA) 90 mcg/actuation inhaler; Inhale 2 puffs every 6 hours if needed for wheezing or shortness of breath.  -     mometasone-formoterol (Dulera) 200-5 mcg/actuation inhaler; Inhale 2 puffs 2 times a day.  Obesity (BMI 30-39.9)  -     Referral to Nutrition Services; Future  Other chronic back pain  -     Referral to Sports Medicine; Future  Discussed and recommended immunizations, seasonal Influenza vaccine, Pneumococcal polysaccharide vaccine (PPSV or PPV-23), Diphtheria, (pertussis), tetanus vaccine, the patient refused, COVID booster, available at the local pharmacies.         "

## 2025-01-27 ENCOUNTER — APPOINTMENT (OUTPATIENT)
Dept: ORTHOPEDIC SURGERY | Facility: HOSPITAL | Age: 40
End: 2025-01-27
Payer: COMMERCIAL

## 2025-01-28 ENCOUNTER — APPOINTMENT (OUTPATIENT)
Dept: ORTHOPEDIC SURGERY | Facility: CLINIC | Age: 40
End: 2025-01-28
Payer: COMMERCIAL

## 2025-01-28 ENCOUNTER — HOSPITAL ENCOUNTER (OUTPATIENT)
Dept: RADIOLOGY | Facility: CLINIC | Age: 40
Discharge: HOME | End: 2025-01-28
Payer: COMMERCIAL

## 2025-01-28 DIAGNOSIS — M62.89 HAMSTRING TIGHTNESS OF RIGHT LOWER EXTREMITY: ICD-10-CM

## 2025-01-28 DIAGNOSIS — M47.816 LUMBAR SPONDYLOSIS: ICD-10-CM

## 2025-01-28 DIAGNOSIS — M51.360 DEGENERATION OF INTERVERTEBRAL DISC OF LUMBAR REGION WITH DISCOGENIC BACK PAIN: ICD-10-CM

## 2025-01-28 DIAGNOSIS — M54.50 LUMBAR PAIN: ICD-10-CM

## 2025-01-28 DIAGNOSIS — M54.50 LUMBAR PAIN: Primary | ICD-10-CM

## 2025-01-28 DIAGNOSIS — M62.838 MUSCLE SPASM: ICD-10-CM

## 2025-01-28 PROCEDURE — 72110 X-RAY EXAM L-2 SPINE 4/>VWS: CPT | Performed by: RADIOLOGY

## 2025-01-28 PROCEDURE — 99214 OFFICE O/P EST MOD 30 MIN: CPT

## 2025-01-28 PROCEDURE — 72120 X-RAY BEND ONLY L-S SPINE: CPT

## 2025-01-28 RX ORDER — TRAMADOL HYDROCHLORIDE 50 MG/1
50 TABLET ORAL 2 TIMES DAILY PRN
Qty: 14 TABLET | Refills: 0 | Status: SHIPPED | OUTPATIENT
Start: 2025-01-28 | End: 2025-02-04

## 2025-01-28 RX ORDER — CYCLOBENZAPRINE HCL 5 MG
5 TABLET ORAL 3 TIMES DAILY PRN
Qty: 30 TABLET | Refills: 1 | Status: SHIPPED | OUTPATIENT
Start: 2025-01-28 | End: 2025-02-17

## 2025-01-28 ASSESSMENT — PAIN - FUNCTIONAL ASSESSMENT: PAIN_FUNCTIONAL_ASSESSMENT: 0-10

## 2025-01-28 ASSESSMENT — PAIN SCALES - GENERAL: PAINLEVEL_OUTOF10: 8

## 2025-02-12 ENCOUNTER — TELEPHONE (OUTPATIENT)
Dept: PRIMARY CARE | Facility: CLINIC | Age: 40
End: 2025-02-12
Payer: COMMERCIAL

## 2025-02-12 DIAGNOSIS — E66.9 OBESITY (BMI 30-39.9): Primary | ICD-10-CM

## 2025-03-19 NOTE — PROGRESS NOTES
"Nutrition Initial Assessment:     Patient Elana Myers is a 39 y.o. male being seen at [facility] who was referred by Dr. José Miguel Shaw on 2/12/25 for   1. Obesity (BMI 30-39.9)  Referral to Nutrition Services          Nutrition Assessment    Patient Active Problem List   Diagnosis    Allergic rhinitis    Asthma    Obesity (BMI 30-39.9)    Influenza vaccine refused       Nutrition History:  Food & Nutrition History: Pt is trying to lose weight, he is losing it slowly. He has been fasting until 3-4 pm. He is very active and played ball so he knows how to exercise. 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  Works Saturdays as well.  Food Allergies:  (none);  Food Intolerances: none  Vitamin/mineral intake: C, Multivitamin Other (Comment) (Fish Oil)  Herbal supplements: Conjugated Linoleic Acid, Garlic, Tumeric,Giner  Medication and Complementary/Alternative Medicine Use:    GI Symptoms:  (none)  Mouth Issues:  (none); Teeth Issues:  (none)  Sleep Habits: 7+ hrs continuous (back  hurts)    Diet Recall:  Meal 1: 1:30 pm - 1 cup beans,  1 cup rice, vegetable,  2 pieces chicken. Today is [employer]  Snack 1: 9 pm - sweet and almond milk  Meal 2:    Snack 2:    Meal 3:    Snack 3:    Food Variety:    Oral Nutrition Supplement Use: Oral Nutrition Supplements:  (Protein Powder  - twice a week)          Fluid Intake: water   oz, 1 cup of coffee - honey, almond milk, 8 oz - coke  Energy Intake: Good > 75 %      Food Preparation:  Cooking: Patient  Grocery Shopping: Patient  Dining Out: 1 to 3 times a week (Rebecca Ramirez, [employer])    Physical Activity:   Weight training and treadmill    Food Security/Insecurity: Food / Nutrition Program Participation:  (no issues)    Anthropometrics:  Height: 185.4 cm (6' 1\")   Weight: 137 kg (303 lb 1.6 oz)                     Weight History:   Daily Weight  03/24/25 : 137 kg (303 lb 1.6 oz)  01/02/25 : 142 kg (313 lb)  08/13/24 : 136 kg (300 " lb)  12/05/23 : 141 kg (311 lb)  12/01/23 : 136 kg (299 lb 12.8 oz)  07/03/23 : 137 kg (303 lb)  11/21/22 : (!) 137 kg (301 lb)  10/05/20 : 134 kg (296 lb)    Weight Change %:  Weight History / % Weight Change: Per pt, recent weight -305#,  270- 2017#    Nutrition Focused Physical Exam Findings:  Subcutaneous Fat Loss:   Defer Subcutaneous Fat Loss Assessment: Defer all  Defer All Reason: Visually appears well nourished with no signs of noticeable wasting    Muscle Wasting:  Defer Muscle Wasting Assessment: Defer all  Defer All Reason: Visually appears well nourished with no signs of noticeable wasting    Physical Findings:  Hair:    Eyes:    Nails:    Skin:    Respiratory:    Edema:        Nutrition Significant Labs:  Last Chem:     Chemistry    Lab Results   Component Value Date/Time     08/13/2024 0221    K 3.6 08/13/2024 0221     08/13/2024 0221    CO2 20 (L) 08/13/2024 0221    BUN 12 08/13/2024 0221    CREATININE 1.14 08/13/2024 0221    Lab Results   Component Value Date/Time    CALCIUM 8.8 08/13/2024 0221    ALKPHOS 48 08/13/2024 0221    AST 19 08/13/2024 0221    ALT 20 08/13/2024 0221    BILITOT 0.5 08/13/2024 0221       , DM Specific Labs Trend (Includes HgbA1C, antibodies & fasting insulin):   Recent Labs     01/02/25  1110 12/01/23  1058 10/05/20  0854   HGBA1C 5.4 5.3 5.2   , Lipid Panel Trend:    Recent Labs     01/02/25  1110 12/01/23  1058 10/05/20  0854   CHOL 238* 234* 187   HDL 40.7 49.6 47.8   LDLCALC 148* 166*  --    LDLF  --   --  118*   VLDL 50* 18 21   TRIG 249* 90 105   , Iron Panel + Serum Ferritin Trend:   Recent Labs     10/05/20  0854   IRON 93   TIBC 303   IRONSAT 31   FERRITIN 367*   , and Vitamin D:   Lab Results   Component Value Date    VITD25 29 (A) 10/05/2020        Medications:  Current Outpatient Medications   Medication Instructions    albuterol (ProAir HFA) 90 mcg/actuation inhaler 2 puffs, inhalation, Every 6 hours PRN    ipratropium-albuteroL (Duo-Neb) 0.5-2.5 mg/3  mL nebulizer solution 3 mL, nebulization, Every 4 hours PRN    meloxicam (MOBIC) 7.51-15 mg, oral, Daily PRN    mometasone-formoterol (Dulera) 200-5 mcg/actuation inhaler 2 puffs, inhalation, 2 times daily RT    ondansetron ODT (ZOFRAN-ODT) 4 mg, oral, Every 8 hours PRN        Estimated Needs:  Total Energy Estimated Needs in 24 hours (kCal): 3000 kCal; Method for Estimating Needs: MSJ x 1.7 - 500-1000 samir   ;     ;     ;                    Nutrition Diagnosis   Malnutrition Diagnosis  Patient has Malnutrition Diagnosis: No    Nutrition Diagnosis  Patient has Nutrition Diagnosis: Yes  Diagnosis Status (1): New  Nutrition Diagnosis 1: Food and nutrition related knowledge deficit  Related to (1): Lack of or limited prior nutrition-related education  As Evidenced by (1): diet recall, need for a heart healthy diet  Additional Nutrition Diagnosis: Diagnosis 2  Nutrition Diagnosis 2: Altered nutrition related to laboratory values  Related to (2): organ dysfunction that leads to biochemical changes  As Evidenced by (2): elevated Total Chol of 238 mg/dl, LDL of 148 mg/dl and TG- 248 mg/dl       Nutrition Interventions/Recommendations   Nutrition Prescription: Oral nutrition General Healthy Diet    Nutrition Interventions:   Food and Nutrient Delivery: Meals & Snacks: Energy-modified diet, General Healthful Diet  Goals: 1) smoothie for breakfast  2) 3 meals per day  3) healthy carb and  healthy protein at all meals and snacks     Coordination of Care:       Nutrition Education:   Nutrition Education Content: Content related nutrition education  Balanced meals using plate method, timing of meals, adequate hydration, benefits of exercise    Educational Handouts Provided: Keys for a Healthy Lifestyle Handout,  Balanced Breakfast, and  Mediterranean Booklet      Nutrition Counseling:   Nutrition Counseling Strategies : Nutrition counseling based on motivational interviewing strategy, Nutrition counseling based on goal  setting strategy    Readiness to Change : Excellent  Level of Understanding : Excellent  Anticipated Compliant : Excellent         Nutrition Monitoring and Evaluation   Food and Nutrient Intake  Monitoring and Evaluation Plan: Meal/snack pattern  Meal/Snack Pattern: Estimated meal and snack pattern  Criteria: Monitor patient's progress towards meal and snack goal(s)         Anthropometric measurements  Monitoring and Evaluation Plan: Weight  Criteria: Monitor patient's progress towards intentional weight loss of 0.5-2 lb per week, trending toward a clinically significant weight loss of 5-10% of current body weight.    Biochemical Data, Medical Tests and Procedures  Monitoring and Evaluation Plan: Lipid profile  Lipid Profile: Triglycerides, serum  Criteria: CHOL <200;  HDL 40-60;  LDL <100;  TG <150 mg/dL         Goal Status:           Follow Up: Patient declined nutrition follow up appointment. This service will remain available if patient wishes to schedule a follow up. Referral is good for 1 year (expires on 2/13/26). If patient wishes to schedule a follow up, can call Nutrition Scheduling Line at [phone].

## 2025-03-24 ENCOUNTER — NUTRITION (OUTPATIENT)
Dept: NUTRITION | Facility: CLINIC | Age: 40
End: 2025-03-24
Payer: COMMERCIAL

## 2025-03-24 VITALS — WEIGHT: 303.1 LBS | BODY MASS INDEX: 40.17 KG/M2 | HEIGHT: 73 IN

## 2025-03-24 DIAGNOSIS — E66.9 OBESITY (BMI 30-39.9): Primary | ICD-10-CM

## 2025-03-24 PROCEDURE — 97802 MEDICAL NUTRITION INDIV IN: CPT | Performed by: INTERNAL MEDICINE

## 2025-03-24 NOTE — PATIENT INSTRUCTIONS
"1) We discussed the importance of following a heart healthy which is a low saturated fat, low cholesterol, low sodium diet. Choose foods with less than 5 grams (g) of total fat per serving. For someone who needs to eat 2,000 calories per day, 50 g to 75 g per day is a good range. Try to pick foods with heart-healthy fats (monounsaturated and polyunsaturated fats). Choose foods with less than 2 g per serving of saturated fat and 0 g of trans fat. (Saturated fat and trans fat are not heart-healthy.) A person who needs to eat 2,000 calories per day should eat no more than 11 g to 15 g of saturated fat in one day. Read ingredients listed on the label. If a food contains partially hydrogenated oils, then it has trans fat. (If it has less than half a gram per serving, the label may still say trans fat-free.)       2)     We reviewed the importance of having consistent meals and snacks throughout the day to improve or maintain good glycemic control and to increase metabolism to aid with weight loss. Pt should aim to consume a meal or snack every 3-4 hours which contains a lean protein (lean chicken, turkey, fish, eggs, low fat yogurt, nuts, peanut butter, bean) and healthy starch (fruits, whole grains)    3) Introduced patient to using a scale of 1-10 to rate hunger/satiety. Reviewed signs of hunger that patient might or might not feel in their body, and encouraged being attentive to notice these signals if they are present. Encouraged patient to honor hunger by eating when feeling at a \"3\" instead of waiting for hunger to become more severe. Encouraged using this method in conjunction with balanced foods on the plate, using the Plate Method.       Hunger should feel like you less focused, less sharp, not listening as well, not thinking as fast. That is when your body is telling you that it needs more nutrients.  You will have eaten the correct amount when you feel more sharp and focused, not when you feel sick or stuffed. "     4) This diet emphasizes plant-based foods like vegetables, fruits, grains, beans, peas, lentils, nuts, and seeds. These foods provide fiber, healthy fat, protein, vitamins, minerals, and other beneficial nutrients. The general, healthful Mediterranean diet may be lower in calories, sodium, added sugars, and saturated fat than other diets.    In addition to the general information presented here, your RDN may make recommendations just for you based on your individual needs or personal and cultural preferences.    This diet emphasizes healthy fats, such as olives and olive oil, avocados, nuts, and seeds.  Less healthy fats are limited. Less healthy fats include certain red meats, high-fat dairy products, and processed foods like donuts and other baked goods.  The primary sources of protein in this diet are seafood, beans, peas, lentils, and nuts.

## 2025-05-05 ENCOUNTER — TELEPHONE (OUTPATIENT)
Dept: PRIMARY CARE | Facility: CLINIC | Age: 40
End: 2025-05-05
Payer: COMMERCIAL

## 2025-05-05 DIAGNOSIS — M25.551 PAIN OF RIGHT HIP: Primary | ICD-10-CM

## 2025-05-12 ENCOUNTER — OFFICE VISIT (OUTPATIENT)
Dept: ORTHOPEDIC SURGERY | Facility: HOSPITAL | Age: 40
End: 2025-05-12
Payer: COMMERCIAL

## 2025-05-12 ENCOUNTER — HOSPITAL ENCOUNTER (OUTPATIENT)
Dept: RADIOLOGY | Facility: HOSPITAL | Age: 40
Discharge: HOME | End: 2025-05-12
Payer: COMMERCIAL

## 2025-05-12 DIAGNOSIS — M25.551 RIGHT HIP PAIN: ICD-10-CM

## 2025-05-12 DIAGNOSIS — M25.551 PAIN OF RIGHT HIP: ICD-10-CM

## 2025-05-12 DIAGNOSIS — M53.3 SACROILIAC JOINT PAIN: Primary | ICD-10-CM

## 2025-05-12 PROCEDURE — 99214 OFFICE O/P EST MOD 30 MIN: CPT | Performed by: PHYSICIAN ASSISTANT

## 2025-05-12 PROCEDURE — 1036F TOBACCO NON-USER: CPT | Performed by: PHYSICIAN ASSISTANT

## 2025-05-12 PROCEDURE — 73502 X-RAY EXAM HIP UNI 2-3 VIEWS: CPT | Mod: RIGHT SIDE | Performed by: RADIOLOGY

## 2025-05-12 PROCEDURE — 73502 X-RAY EXAM HIP UNI 2-3 VIEWS: CPT | Mod: RT

## 2025-05-12 ASSESSMENT — PAIN SCALES - GENERAL: PAINLEVEL_OUTOF10: 10 - WORST POSSIBLE PAIN

## 2025-05-12 ASSESSMENT — PAIN - FUNCTIONAL ASSESSMENT: PAIN_FUNCTIONAL_ASSESSMENT: 0-10

## 2025-05-12 NOTE — PROGRESS NOTES
"Subjective    Patient ID: Elana Myers \"JUSTINE\" is a 39 y.o. male.    Chief Complaint: Pain of the Right Hip           HPI:  Elana Myers \"JUSTINE\" is a 39 y.o. male who presents today for complaint of right hip pain.  He states that for the last several months he has been having sharp pain mainly in the posterior aspect of his right hip.  He initially thought it was stemming from his low back.  He states that for months he had sought chiropractic care but was not seeing improvement.  He saw one of our spine specialists in January and was referred to physical therapy.  He states that he did quite a bit of stretching and dry needling without any improvement in his symptoms.  Today he reports continued pain that is worse with just about any type of activity.  He notes a presence of pain at night when trying to sleep.  He denies any specific radicular type symptoms.  He has been utilizing Flexeril but this causes sedation.  He also has used tramadol but this only lasts a brief period of time.  He states that he has taken Advil in the past but he gets \"kidney pain\" with this.    ROS  Constitutional: No fever, no chills, not feeling tired, no recent weight gain and no recent weight loss  ENT: No nosebleeds  Cardiovascular: No chest pain  Respiratory: No shortness of breath and no cough  Gastrointestinal: No abdominal pain, no nausea, no diarrhea, and no vomiting  Musculoskeletal: No arthralgias  Integumentary: No rashes and no skin lesions  Neurological: No headache  Psychiatric: No sleep disturbances no depression  Endocrine: No muscle weakness and no muscle cramps  Hematologic/lymphatic: No swelling glands and no tendency for easy bruising    Medical History[1]     Surgical History[2]     Current Medications[3]     RX Allergies[4]     Social Connections: Not on file          Objective   39-year-old male well appearing in no acute distress. Alert and oriented ×3.  Skin intact bilateral lower extremities.   Normal tandem " gait. Coordination and balance intact.  Bilateral lower extremity compartments supple.  5 out of 5 distal motor strength bilaterally.  L4 through S1 sensation intact bilaterally.  2+ DP/PT pulses bilaterally.  He has pain in the posterior aspect of his right hip when he flexes forward at his waist.  No pain with lumbar extension.  No tenderness over the greater trochanter on either side.  No increased pain with passive hip flexion and to 90 degrees on the right.  No increased pain with passive internal or external rotation.    Image Results:  X-rays of the right hip taken today in the office were reviewed.  These were negative for fracture or dislocation.  No significant arthritic changes.      Assessment/Plan   Encounter Diagnoses:  Sacroiliac joint pain    Right hip pain    Pain of right hip    Orders Placed This Encounter    XR hip right with pelvis when performed 2 or 3 views       I believe that he is experiencing pain from his SI joint.  He is being referred to Dr. Rico for possible injection therapies.  We discussed that injection therapies can be for both therapeutic and diagnostic purposes.  We will see if he has any improvement from an SI joint injection.  If he does not then consideration could be given towards an intra-articular hip injection.  He may continue with his current medications.  I also recommended application of Salonpas or lidocaine patches to help with his pain.    This office note was dictated using Dragon voice to text software and was not proofread for spelling or grammatical errors       [1]   Past Medical History:  Diagnosis Date    Personal history of nicotine dependence 10/05/2020    History of nicotine dependence   [2] History reviewed. No pertinent surgical history.  [3]   Current Outpatient Medications:     albuterol (ProAir HFA) 90 mcg/actuation inhaler, Inhale 2 puffs every 6 hours if needed for wheezing or shortness of breath., Disp: 18 g, Rfl: 3    ipratropium-albuteroL  (Duo-Neb) 0.5-2.5 mg/3 mL nebulizer solution, Take 3 mL by nebulization every 4 hours if needed for shortness of breath or wheezing., Disp: 180 mL, Rfl: 1    meloxicam (Mobic) 7.5 mg tablet, Take 1-2 tablets (7.51-15 mg) by mouth once daily as needed for moderate pain (4 - 6)., Disp: 30 tablet, Rfl: 0    mometasone-formoterol (Dulera) 200-5 mcg/actuation inhaler, Inhale 2 puffs 2 times a day., Disp: 13 g, Rfl: 3    ondansetron ODT (Zofran-ODT) 4 mg disintegrating tablet, Take 1 tablet (4 mg) by mouth every 8 hours if needed for nausea or vomiting., Disp: 15 tablet, Rfl: 0  [4]   Allergies  Allergen Reactions    Penicillins Anaphylaxis    Sulfa (Sulfonamide Antibiotics) Anaphylaxis and Hives

## 2025-05-13 ENCOUNTER — TELEPHONE (OUTPATIENT)
Dept: PRIMARY CARE | Facility: CLINIC | Age: 40
End: 2025-05-13
Payer: COMMERCIAL

## 2025-05-19 ENCOUNTER — TELEPHONE (OUTPATIENT)
Dept: ORTHOPEDIC SURGERY | Facility: CLINIC | Age: 40
End: 2025-05-19
Payer: COMMERCIAL

## 2025-05-19 DIAGNOSIS — M62.838 MUSCLE SPASM: Primary | ICD-10-CM

## 2025-05-19 RX ORDER — CYCLOBENZAPRINE HCL 10 MG
10 TABLET ORAL 3 TIMES DAILY PRN
Qty: 30 TABLET | Refills: 0 | Status: SHIPPED | OUTPATIENT
Start: 2025-05-19 | End: 2025-05-29

## 2025-05-19 NOTE — TELEPHONE ENCOUNTER
Received a message stating that patient requested pain medication refill.  Last office visit with me 1/28/2025.  States he was told by pain management that I would need to continue to refill his tramadol.  I called the patient, no answer, left a voicemail.  I told the patient I refilled his muscle relaxers, but could not continue to refill his tramadol without further evaluation.  He was recommended to continue with pain management or PCP.    **This note was dictated using speech recognition software and was not corrected for spelling or grammatical errors**    Peyman Mena PA-C  Department of Orthopaedic Surgery  9:14 AM  05/19/25    [id number] [place] [address]    Voicemail: [phone]   Appts: [phone]  Fax: [phone]

## 2025-05-28 ENCOUNTER — OFFICE VISIT (OUTPATIENT)
Dept: ORTHOPEDIC SURGERY | Facility: HOSPITAL | Age: 40
End: 2025-05-28
Payer: COMMERCIAL

## 2025-05-28 VITALS — BODY MASS INDEX: 40.16 KG/M2 | WEIGHT: 303 LBS | HEIGHT: 73 IN

## 2025-05-28 DIAGNOSIS — M54.17 RADICULITIS, LUMBOSACRAL: Primary | ICD-10-CM

## 2025-05-28 DIAGNOSIS — F40.240 CLAUSTROPHOBIA: ICD-10-CM

## 2025-05-28 PROCEDURE — 99202 OFFICE O/P NEW SF 15 MIN: CPT | Performed by: STUDENT IN AN ORGANIZED HEALTH CARE EDUCATION/TRAINING PROGRAM

## 2025-05-28 PROCEDURE — 3008F BODY MASS INDEX DOCD: CPT | Performed by: STUDENT IN AN ORGANIZED HEALTH CARE EDUCATION/TRAINING PROGRAM

## 2025-05-28 PROCEDURE — 99204 OFFICE O/P NEW MOD 45 MIN: CPT | Performed by: STUDENT IN AN ORGANIZED HEALTH CARE EDUCATION/TRAINING PROGRAM

## 2025-05-28 RX ORDER — PREDNISONE 10 MG/1
TABLET ORAL
Qty: 64 TABLET | Refills: 0 | Status: SHIPPED | OUTPATIENT
Start: 2025-05-28 | End: 2025-05-28

## 2025-05-28 RX ORDER — DIAZEPAM 5 MG/1
TABLET ORAL
Qty: 2 TABLET | Refills: 0 | Status: SHIPPED | OUTPATIENT
Start: 2025-05-28

## 2025-05-28 RX ORDER — METHOCARBAMOL 500 MG/1
500 TABLET, FILM COATED ORAL 3 TIMES DAILY PRN
Qty: 90 TABLET | Refills: 1 | Status: SHIPPED | OUTPATIENT
Start: 2025-05-28 | End: 2025-07-27

## 2025-05-28 RX ORDER — PREDNISONE 10 MG/1
TABLET ORAL
Qty: 25 TABLET | Refills: 0 | Status: SHIPPED | OUTPATIENT
Start: 2025-05-28

## 2025-05-28 RX ORDER — MELOXICAM 15 MG/1
15 TABLET ORAL DAILY PRN
Qty: 30 TABLET | Refills: 1 | Status: SHIPPED | OUTPATIENT
Start: 2025-05-28 | End: 2025-07-27

## 2025-05-28 ASSESSMENT — PAIN - FUNCTIONAL ASSESSMENT: PAIN_FUNCTIONAL_ASSESSMENT: 0-10

## 2025-05-28 ASSESSMENT — PAIN SCALES - GENERAL: PAINLEVEL_OUTOF10: 10 - WORST POSSIBLE PAIN

## 2025-05-28 NOTE — PROGRESS NOTES
"  Assessment     JUSTINE is a 39 y.o. male with significant past medical history of  obesity, chronic back pain , who presents with right sided radicular low back pain .  The patient's symptoms, clinical exam and imaging studies are suggestive of lumbar radiculitis .  The other possible differential diagnosis(es) include(s):  myofacial pain.      Plan     At this time, I would like to make the following recommendation/plan:  -  Physical Therapy: provided HEP, completed multiple rounds of PT  -  Medication: discontinue flexeril trail of methocarbamol, prednisone taper, followed by meloxicam (avoid NSAIDS while on steroid)  -  Studies: Interpreted: CT scan abdomen and pelvis; showed IV disc disease st L5-S1 mel[place] right sided foraminal narrowing and hip xray normal   - Medical necessity: The patient is presenting primarily with Lumbar pain and radicular symptoms.  The pain is constant and of moderate severity that interferes with activities of daily living and sleep.  It is medically necessary to obtain an MRI of the Lumbar spine without contrast as patient has failed conservative treatment to include: tylenol/NSAIDs, greater than 6 weeks of physical therapy within the last 3-6 months, and greater than 6 weeks of physician directed home exercise program.  The MRI is needed for planning a potential injection or a surgical referral. Prescribed valium for anxiety   -  reviewed Johnathon Hernandez's note from 05/12/25: 'I believe that he is experiencing pain from his SI joint.  He is being referred to Dr. Rico for possible injection therapies.  We discussed that injection therapies can be for both therapeutic and diagnostic purposes.  We will see if he has any improvement from an SI joint injection.  If he does not then consideration could be given towards an intra-articular hip injection.  He may continue with his current medications.  I also recommended application of Salonpas or lidocaine patches to help with his pain. \"    Follow " "up:  Follow up in 1 month MRI.    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   Pain first started 15 years ago associated with playing sports in college; was managed by conservative tx but progressively worsening and now refractory to conservative treatment since January 2025. The pain as located in the right low back above the pant line. Patient reports that he's been having flares off and on but now happening more frequently.     Pain:        Severity:  Elana Myers \"JJ\" states pain is: 10/10 at it's worst. On average pain is 7/10 (Scale 0-no pain, 10-worst pain)      Quality:  dull, sharp, aching, and knife-like. fluctuating      Radiating: occasionally radiating to right groin      Aggravating Factors: running physical exertion, walking,bending twisting, getting in and out of a car      Alleviating Factors:  flexeril, messages, inversion table, traction,. Leaning forward (\"shopping cart\")      Time of day: worse first thing in the morning      Associated symptoms:  no numbness or tingling in the lower extremity ; no balance problems     Physical Therapy/Occupational Therapy/Other Modalities:    - Chiropractor/OMT: stopped in 11/24 after years of going almost weekly ; stopped helping  - Acupuncture: hasn't helped (stopped 1-2 weeks ago) initially helpful but no longer  - message: weekly     Last PT session: 2 weeks ago  # of sessions completed: >6 sessions with mild  benefit (completed multiple)     Topicals:   ICE/Heat: heat helpful but not long lasting   Topicals (Capsicin/Diclofenac gel/Salonpas/Lidocaine): lidocaine, hemp cream, Voltaren, tiger balm hasn't helped    Medications:   - Over the counter : (Tylenol, NSAIDs)  Advil bid daily helps  - Steroid: in the past for asthma but didn't notice benefit with his low back pain  - " Gabapentin/Lyrica: hasn't tried   - Muscle relaxer: flexeril helpful but makes him tired  - Duloxetine/Topamax/oxcarbazepine: hasn't tried   - Opiates:tramadol with short term    Current Medications[1]    RX Allergies[2]    Injections:    Date/Injection Type/Location/%relief/ Lasting  -  2 years ago right CSI to shoulder with out adverse event     Surgery:  Date/Type/Location/%relief/ Lasting    Surgical History[3]    Medical History[4]      ROS:  - Sleep: Sleep is disrupted secondary to pain  - Bowel/Bladder: Denies bowel/bladder dysfunction (reports pain with straining to urinate)  - Falls: Denies fall  - Weight changes: Denies  - Mood/Psych: Denies any feelings of anxiety or depression    12-point review of systems was completed and is otherwise negative except as noted in the HPI.      Social Hx:  - Home: Lives with cousin and aunt in a house.   - ADLs: Independent in ADLs and ambulation without assistive device. ; occasionally needing cane to walk when having pain  - Hobbies: exercise (gym currently modified to body wt only), [employer] games, [employer] range, traveling  - Work:  IT worker  - Smoking/Alcohol/Drugs: No/every 1-2 months-socially/    Objective     Physical Exam:  General:  Appears state age, in NAD, and obese  Psychological:  Normal mood and affect  Pulm:  Breathing comfortably on RA    Gait:   - Normal  - Without assistive device  - able to heel walk, able to toe walk    Inspection:   - No erythema, swelling/edema, ecchymosis or deformity noted  - normal muscle bulk of bilateral lower extremity     Sensation:  - Intact to light touch in bilateral lower extremity    Palpation:  - No tenderness to palpation of bilateral greater trochanter  - tenderness to palpation of right sacroiliac joint  - tenderness to palpation of right spinal paraspinals at the level of lumbosacral spine  and QL  - No tenderness to palpation of spinous process at the level of lumbosacral spine     Range of Motion:  - Full  thoracolumbar flexion>extension painful /rotation (pain on right)/sidebending (pain on right)  - Full painless bilateral hip flexion/internal rotation/external rotation    Strength:  Side Hip Flexion (L2) Knee Extension (L3) Hip Adduction  (L2-L4) Hip Abduction  (L5-S1)   Right 5 5 5 5   Left 5 5 5 5     Reflexes:  Side Patellar (L4) Achilles (S1)  Medial hamstring(L5)   Right 2+ 2+ NT   Left 2+ 2+ NT     Special tests:  - Disc/Nerve root: (SLR): neg b/l   - Facet loading: neg b/l   - SI Joint (Compression, Gaenslen, and Thigh thrust): neg on right   - DEO: neg b/l   - FADIR: neg b/l       Imaging and Other Studies:    Imaging  XR HIP RIGHT WITH PELVIS WHEN PERFORMED 2 OR 3 VIEWS      INDICATION:  Signs/Symptoms:pain.      COMPARISON:  None      ACCESSION NUMBER(S):  [id number]      ORDERING CLINICIAN:  CHENCHO COLE      FINDINGS:  No osseous, articular, or soft tissue abnormality identified.      IMPRESSION:  Normal radiographs right hip.     XR LUMBAR SPINE 4+ VIEWS WITH FLEXION EXTENSION      INDICATION:  Signs/Symptoms:lumbar pain.      COMPARISON:  None      ACCESSION NUMBER(S):  [id number]      ORDERING CLINICIAN:  YVETTE MARSH      FINDINGS:  Mild lumbar degenerative changes diffusely.      Minimal scoliosis. No subluxation or pathologic motion.      IMPRESSION:  Mild lumbar degenerative changes.           [1]   Current Outpatient Medications:     albuterol (ProAir HFA) 90 mcg/actuation inhaler, Inhale 2 puffs every 6 hours if needed for wheezing or shortness of breath., Disp: 18 g, Rfl: 3    cyclobenzaprine (Flexeril) 10 mg tablet, Take 1 tablet (10 mg) by mouth 3 times a day as needed for muscle spasms for up to 10 days., Disp: 30 tablet, Rfl: 0    ipratropium-albuteroL (Duo-Neb) 0.5-2.5 mg/3 mL nebulizer solution, Take 3 mL by nebulization every 4 hours if needed for shortness of breath or wheezing., Disp: 180 mL, Rfl: 1    meloxicam (Mobic) 7.5 mg tablet, Take 1-2 tablets (7.51-15 mg) by mouth once  daily as needed for moderate pain (4 - 6)., Disp: 30 tablet, Rfl: 0    mometasone-formoterol (Dulera) 200-5 mcg/actuation inhaler, Inhale 2 puffs 2 times a day., Disp: 13 g, Rfl: 3    ondansetron ODT (Zofran-ODT) 4 mg disintegrating tablet, Take 1 tablet (4 mg) by mouth every 8 hours if needed for nausea or vomiting., Disp: 15 tablet, Rfl: 0  [2]   Allergies  Allergen Reactions    Penicillins Anaphylaxis    Sulfa (Sulfonamide Antibiotics) Anaphylaxis and Hives   [3] No past surgical history on file.  [4]   Past Medical History:  Diagnosis Date    Personal history of nicotine dependence 10/05/2020    History of nicotine dependence

## 2025-06-09 ENCOUNTER — HOSPITAL ENCOUNTER (OUTPATIENT)
Dept: RADIOLOGY | Facility: HOSPITAL | Age: 40
Discharge: HOME | End: 2025-06-09
Payer: COMMERCIAL

## 2025-06-09 DIAGNOSIS — M54.17 RADICULITIS, LUMBOSACRAL: ICD-10-CM

## 2025-06-09 PROCEDURE — 72148 MRI LUMBAR SPINE W/O DYE: CPT | Performed by: RADIOLOGY

## 2025-06-09 PROCEDURE — 72148 MRI LUMBAR SPINE W/O DYE: CPT

## 2025-06-10 ENCOUNTER — TELEPHONE (OUTPATIENT)
Dept: ORTHOPEDIC SURGERY | Facility: CLINIC | Age: 40
End: 2025-06-10
Payer: COMMERCIAL

## 2025-06-10 DIAGNOSIS — M46.20: Primary | ICD-10-CM

## 2025-06-10 NOTE — TELEPHONE ENCOUNTER
Attempted to call patient to discuss MRI results 2x without answer message left to call back as soon as possible.     In the meantime CBC, ESR and CRP ordered.

## 2025-06-12 LAB
CRP SERPL-MCNC: 5 MG/L
ERYTHROCYTE [DISTWIDTH] IN BLOOD BY AUTOMATED COUNT: 14.9 % (ref 11–15)
ERYTHROCYTE [SEDIMENTATION RATE] IN BLOOD BY WESTERGREN METHOD: 2 MM/H
HCT VFR BLD AUTO: 46.8 % (ref 38.5–50)
HGB BLD-MCNC: 15.1 G/DL (ref 13.2–17.1)
MCH RBC QN AUTO: 29 PG (ref 27–33)
MCHC RBC AUTO-ENTMCNC: 32.3 G/DL (ref 32–36)
MCV RBC AUTO: 90 FL (ref 80–100)
PLATELET # BLD AUTO: 224 THOUSAND/UL (ref 140–400)
PMV BLD REES-ECKER: 10.2 FL (ref 7.5–12.5)
RBC # BLD AUTO: 5.2 MILLION/UL (ref 4.2–5.8)
WBC # BLD AUTO: 7.4 THOUSAND/UL (ref 3.8–10.8)

## 2025-06-26 ENCOUNTER — TELEPHONE (OUTPATIENT)
Dept: ORTHOPEDIC SURGERY | Facility: HOSPITAL | Age: 40
End: 2025-06-26
Payer: COMMERCIAL

## 2025-06-26 NOTE — TELEPHONE ENCOUNTER
Attempted to report normal blood results to Mr. Myers.    There is no evidence of infection in your spine. Looking forward to seeing you in the office for your follow up.

## 2025-07-01 ENCOUNTER — OFFICE VISIT (OUTPATIENT)
Dept: ORTHOPEDIC SURGERY | Facility: HOSPITAL | Age: 40
End: 2025-07-01
Payer: COMMERCIAL

## 2025-07-01 DIAGNOSIS — M54.89 VERTEBROGENIC PAIN: Primary | ICD-10-CM

## 2025-07-01 DIAGNOSIS — M54.17 RADICULITIS, LUMBOSACRAL: ICD-10-CM

## 2025-07-01 PROCEDURE — 99212 OFFICE O/P EST SF 10 MIN: CPT | Performed by: STUDENT IN AN ORGANIZED HEALTH CARE EDUCATION/TRAINING PROGRAM

## 2025-07-01 PROCEDURE — 1036F TOBACCO NON-USER: CPT | Performed by: STUDENT IN AN ORGANIZED HEALTH CARE EDUCATION/TRAINING PROGRAM

## 2025-07-01 PROCEDURE — 99214 OFFICE O/P EST MOD 30 MIN: CPT | Performed by: STUDENT IN AN ORGANIZED HEALTH CARE EDUCATION/TRAINING PROGRAM

## 2025-07-01 RX ORDER — NAPROXEN 500 MG/1
500 TABLET ORAL 2 TIMES DAILY PRN
Qty: 60 TABLET | Refills: 0 | Status: SHIPPED | OUTPATIENT
Start: 2025-07-01 | End: 2025-07-31

## 2025-07-01 RX ORDER — METHOCARBAMOL 500 MG/1
500 TABLET, FILM COATED ORAL 3 TIMES DAILY PRN
Qty: 90 TABLET | Refills: 1 | Status: SHIPPED | OUTPATIENT
Start: 2025-07-01 | End: 2025-08-30

## 2025-07-01 NOTE — PROGRESS NOTES
"Assessment     JUSTINE is a 39 y.o. male with significant past medical history of obesity, chronic back pain , who presents with right sided radicular low back pain .  The patient's symptoms, clinical exam and imaging studies are suggestive of lumbar radiculitis .  The other possible differential diagnosis(es) include(s):  myofacial pain.      His symptoms have stayed the same since the last visit.      Injections Hx:    Date/Injection Type/Location/%relief/ Lasting  - 2 years ago right CSI to shoulder with out adverse event       Surgery: Hx:  Date/Type/Location/%relief/ Lasting    Plan     At this time, I would like to make the following recommendation/plan:  -  Physical Therapy: provided HEP, completed multiple rounds of PT  -  Medication: refill methocarbamol,  tril naproxen  -  Studies: Interpreted: CT scan abdomen and pelvis; showed IV disc disease st L5-S1 [place] right sided foraminal narrowing and hip xray normal   - referral for intercept given vertebral body inflammation  at L5- and S1  - of note CRP, CBC, ESR WNL    Follow up:  Follow up in prn      Subjective    Chief Complaint: right sided low back pain     HPI:  Elana Myers \"JUSTINE\" is a 39 y.o. male, works in IT, with pertinent PMH of  obesity, chronic back pain  who is following up today for low back pain.   Pain first started 15 years ago associated with playing sports in college; was managed by conservative tx but progressively worsening and now refractory to conservative treatment since January 2025 .  He was last seen on 05/28/25. Plan at the time was MRI, steroid, and meloxicam.  Since his last visit symptoms have stayed the same. Today, pain is located right low back above the pant jw , described as dull, sharp, aching, and knife-like. fluctuating radiating right groin, 10/10 at it's worst average 7/10.    Since last visit:  - Physical Therapy: Last PT was on 05/2025. Patient has completed >6 sessions with mild benefit.   - Medication: steroid helped " while taking but pain recurred with taper, methocarbamol helpful and better tolerated   - Imaging:  MRI of  spine completed with disc inflammation but no evidence of stenosis  - labs negative of discitis     ROS:  - Sleep: Sleep is disrupted secondary to pain  - Bowel/Bladder: Denies bowel/bladder dysfunction (reports pain with straining to urinate)  - Falls: Denies fall  - Weight changes: Denies  - Mood/Psych: Denies any feelings of anxiety or depression    12-point review of systems was completed and is otherwise negative except as noted in the HPI.       Social Hx:  - Home: Lives with cousin and aunt in a house.   - ADLs: Independent in ADLs and ambulation without assistive device. ; occasionally needing cane to walk when having pain  - Hobbies: exercise (gym currently modified to body wt only), video games, shooting range, traveling  - Work:  IT worker  - Smoking/Alcohol/Drugs: No/every 1-2 months-socially/    Objective     Physical Exam  General:  Appears state age, in NAD, and well nourished  Psychological:  Normal mood and affect  Pulm:  Breathing comfortably on RA    Gait:   - Normal  - Without assistive device  - able to heel walk, able to toe walk     Inspection:   - No erythema, swelling/edema, ecchymosis or deformity noted  - normal muscle bulk of bilateral lower extremity      Sensation:  - Intact to light touch in bilateral lower extremity     Palpation:  - No tenderness to palpation of bilateral greater trochanter  - tenderness to palpation of right sacroiliac joint  - tenderness to palpation of right spinal paraspinals at the level of lumbosacral spine  and QL  - No tenderness to palpation of spinous process at the level of lumbosacral spine      Range of Motion:  - Full thoracolumbar flexion>extension painful /rotation (pain on right)/sidebending (pain on right)  - Full painless bilateral hip flexion/internal rotation/external rotation     Strength:  Side Hip Flexion (L2) Knee Extension (L3) Hip  Adduction  (L2-L4) Hip Abduction  (L5-S1)   Right 5 5 5 5   Left 5 5 5 5      Reflexes:  Side Patellar (L4) Achilles (S1)  Medial hamstring(L5)   Right 2+ 2+ NT   Left 2+ 2+ NT      Special tests:  - Disc/Nerve root: (SLR): neg b/l   - Facet loading: neg b/l   - SI Joint (Compression, Gaenslen, and Thigh thrust): neg on right   - DEO: neg b/l   - FADIR: neg b/l     Imaging and Other Studies:    MR LUMBAR SPINE WO IV CONTRAST;  6/9/2025 8:32 am      INDICATION:  Signs/Symptoms:lumabr radiuclitis. ,M54.17 Radiculopathy, lumbosacral  region      COMPARISON:  None.      ACCESSION NUMBER(S):  [id number]      ORDERING CLINICIAN:  LAVELLE RAY      TECHNIQUE:  The lumbar spine was studied in the sagital, axial and coronal planes  utiliing T1 and T2 weighted images.      FINDINGS:  The marrow signal and vertebral body height are normal. The conus and  sacrum are normal. Images at each interspace reveal the following:  T12/L1  There is normal alignment and vertebral body height. The disc space  is normal. There is no evidence of canal or foraminal narrowing.  There is no evidence of bulging or herniated disc. L1/L2  There is normal alignment and vertebral body height. The disc space  is normal. There is no evidence of canal or foraminal narrowing.  There is no evidence of bulging or herniated disc. L2/L3  Slight loss of height and signal at the intervertebral disc space  with abnormal marrow signal near the superior endplate of L3  anteriorly. Findings consistent with degenerative changes and early  Modic type 2 signal marrow signal changes. L3/L4  There is normal alignment and vertebral body height. The disc space  is normal. There is no evidence of canal or foraminal narrowing.  There is no evidence of bulging or herniated disc. L4/L5  There is normal alignment and vertebral body height. The disc space  is normal. There is no evidence of canal or foraminal narrowing.  There is no evidence of bulging or herniated disc.  L5/S1  Loss of height and signal at the intervertebral disc space with  marrow edema throughout L5 and S1 vertebral bodies. Associated  marginal osteophyte formation and posterior bulging intervertebral  disc. Although consistent with degenerative change and discogenic  marrow edema, discitis is not excluded. Suggest postcontrast enhanced  exam and follow-up.          IMPRESSION:  * Abnormal disc and marrow signal changes are concerning for  discitis/osteomyelitis. Suggest postcontrast enhanced examination and  follow-up.    XR HIP RIGHT WITH PELVIS WHEN PERFORMED 2 OR 3 VIEWS      INDICATION:  Signs/Symptoms:pain.      COMPARISON:  None      ACCESSION NUMBER(S):  [id number]      ORDERING CLINICIAN:  CHENCHO COLE      FINDINGS:  No osseous, articular, or soft tissue abnormality identified.      IMPRESSION:  Normal radiographs right hip.    XR LUMBAR SPINE 4+ VIEWS WITH FLEXION EXTENSION      INDICATION:  Signs/Symptoms:lumbar pain.      COMPARISON:  None      ACCESSION NUMBER(S):  [id number]      ORDERING CLINICIAN:  YVETTE MARSH      FINDINGS:  Mild lumbar degenerative changes diffusely.      Minimal scoliosis. No subluxation or pathologic motion.      IMPRESSION:  Mild lumbar degenerative changes.

## 2025-07-03 ENCOUNTER — TELEPHONE (OUTPATIENT)
Dept: ORTHOPEDIC SURGERY | Facility: CLINIC | Age: 40
End: 2025-07-03
Payer: COMMERCIAL

## 2025-07-03 NOTE — TELEPHONE ENCOUNTER
Patient called, states he was seen earlier this week and was expecting a phone call regarding next steps.  Please call, thank you.

## 2025-08-05 ENCOUNTER — OFFICE VISIT (OUTPATIENT)
Dept: PAIN MEDICINE | Facility: HOSPITAL | Age: 40
End: 2025-08-05
Payer: COMMERCIAL

## 2025-08-05 DIAGNOSIS — M54.17 RADICULITIS, LUMBOSACRAL: ICD-10-CM

## 2025-08-05 DIAGNOSIS — M54.51 VERTEBROGENIC LOW BACK PAIN: Primary | ICD-10-CM

## 2025-08-05 DIAGNOSIS — M54.89 VERTEBROGENIC PAIN: ICD-10-CM

## 2025-08-05 DIAGNOSIS — M51.360 DISCOGENIC LOW BACK PAIN: ICD-10-CM

## 2025-08-05 PROCEDURE — 99204 OFFICE O/P NEW MOD 45 MIN: CPT | Performed by: ANESTHESIOLOGY

## 2025-08-05 RX ORDER — NAPROXEN 500 MG/1
500 TABLET ORAL 2 TIMES DAILY PRN
Qty: 60 TABLET | Refills: 0 | Status: SHIPPED | OUTPATIENT
Start: 2025-08-05 | End: 2025-09-04

## 2025-08-05 RX ORDER — METHOCARBAMOL 500 MG/1
500 TABLET, FILM COATED ORAL 3 TIMES DAILY PRN
Qty: 90 TABLET | Refills: 0 | Status: SHIPPED | OUTPATIENT
Start: 2025-08-05 | End: 2025-10-04

## 2025-08-05 ASSESSMENT — PAIN SCALES - GENERAL: PAINLEVEL_OUTOF10: 7

## 2025-08-05 NOTE — PROGRESS NOTES
Chief complaint: Back pain    HPI   Elana Myers is a 40-year-old male with a history of back pain.  The patient was referred initially by Dr. Rico with physical medicine and rehabilitation, she curbside me while the patient was in the office.  Referral was for consideration of basivertebral nerve ablation/the Intracept procedure.    The patient has a longstanding history of back pain.  The patient says he has back pain for over 20 years.  He relates his inciting event to a football injury.  The patient remembers a specific game where he hurt his back though not the specific move that he was making when it got hurt.  Patient said that he had episodic pain initially and was treated with oral medications and opioids like Vicodin and Percocet.  The patient had an MRI back at the time of his initial injury and was advised there was no surgical issue.  He saw a chiropractor for many years, Dr. Erickson who had him doing physical therapy exercises and alignment and he would see him every couple of weeks, 2 to 3 weeks ongoing.  He would keep up with the physician directed exercises in the interim several times a week as well.  The patient says that the pain used to go away and he would have episodes where he had no pain but now the pain is constant.  The patient says that he did recently complete formal physical therapy again about a month ago and did not see any significant improvement.  His pain is predominately in the center of the low back at the L5-S1 level.  He rarely has right sided groin pain and never has leg pain.  He has tried acupuncture.  He has not had prior epidurals.  He notes that the pain is worse with coughing, twisting, sudden movements, bending, lifting.  In the past when the pain was more episodic he would also see worsened pain with running, jump rope, activity in general.  Pain is bad in the morning and also being in 1 position too long can be more uncomfortable.    The patient says that he has not  had a day without back pain in 5 to 6 years.    Medication list includes methocarbamol, naproxen.  He has tried oral steroids in the past and Tylenol in addition to the medications listed in HPI.    With his MRI there was concern for infection.  The patient has no constitutional symptoms of infection.  He also had ESR, CRP, and CBC.  Labs were normal.    ROS: 13 point review of systems is complete and is negative listed above in HPI    Medical History[1]    Surgical History[2]    Family History[3]    Social History[4]    Medications Ordered Prior to Encounter[5]     RX Allergies[6]       Imaging:  Narrative & Impression   Interpreted By:  Jean Carlos Landin,   STUDY:  MR LUMBAR SPINE WO IV CONTRAST;  6/9/2025 8:32 am      INDICATION:  Signs/Symptoms:lumabr radiuclitis. ,M54.17 Radiculopathy, lumbosacral  region      COMPARISON:  None.      ACCESSION NUMBER(S):  [id number]      ORDERING CLINICIAN:  LAVELLE RAY      TECHNIQUE:  The lumbar spine was studied in the sagital, axial and coronal planes  utiliing T1 and T2 weighted images.      FINDINGS:  The marrow signal and vertebral body height are normal. The conus and  sacrum are normal. Images at each interspace reveal the following:  T12/L1  There is normal alignment and vertebral body height. The disc space  is normal. There is no evidence of canal or foraminal narrowing.  There is no evidence of bulging or herniated disc. L1/L2  There is normal alignment and vertebral body height. The disc space  is normal. There is no evidence of canal or foraminal narrowing.  There is no evidence of bulging or herniated disc. L2/L3  Slight loss of height and signal at the intervertebral disc space  with abnormal marrow signal near the superior endplate of L3  anteriorly. Findings consistent with degenerative changes and early  Modic type 2 signal marrow signal changes. L3/L4  There is normal alignment and vertebral body height. The disc space  is normal. There is no evidence of  canal or foraminal narrowing.  There is no evidence of bulging or herniated disc. L4/L5  There is normal alignment and vertebral body height. The disc space  is normal. There is no evidence of canal or foraminal narrowing.  There is no evidence of bulging or herniated disc. L5/S1  Loss of height and signal at the intervertebral disc space with  marrow edema throughout L5 and S1 vertebral bodies. Associated  marginal osteophyte formation and posterior bulging intervertebral  disc. Although consistent with degenerative change and discogenic  marrow edema, discitis is not excluded. Suggest postcontrast enhanced  exam and follow-up.          IMPRESSION:  * Abnormal disc and marrow signal changes are concerning for  discitis/osteomyelitis. Suggest postcontrast enhanced examination and    follow-up.       Physical Exam:  Gen.: No acute distress  Eyes: Pupils symmetric  ENT: Hearing is without noted deficits  Respiratory: No SOB  Cardiovascular: Extremities show no edema   Skin: No rash, no lesion  Musculoskeletal: Gait is grossly normal, no assist device, nontender over the SI joints or facet joints.  Patient has some reproducible pain with palpation directly in the midline at the L5-S1 level.  Neurologic: Cranial nerves II through XII are grossly intact  Psychiatric:  Patient is alert and oriented x3    Impression/Plan:  40-year-old male with a history of back pain which has been chronic and longstanding for 20+ years.  Patient has a history and physical consistent with discogenic/vertebrogenic back pain.  He has had a significant amount of treatment including multiple courses of physical therapy, years of following with chiropractor, multiple medications orally both opioid and nonopioid.  Patient was referred by physical medicine rehabilitation physician to consider basivertebral nerve ablation.    -Will plan for bilateral L5 transforaminal.  Procedure, risk, benefits, alternatives reviewed.  We discussed that this  treatment may or may not help based on his advanced degenerative changes in the disc but also in the adjacent bones.    -Encouraged him to keep up with physical therapy for strengthening.      -I do think he would be a good candidate for basivertebral nerve ablation based on his mixed Modic 1/2 type changes at the L5 and S1 vertebral bodies.  He does have some changes at the anterior superior aspect of L3 and the inferior anterior aspect of L2 but that would not as much correlate with the pain.  Most of his symptoms are focused in the L5-S1 level.  We discussed the procedure in detail as well as the risk, benefits, alternatives.  Patient is interested in pursuing it.  We discussed that this can be a long authorization process but based on the area and exacerbating/relieving factors as well as imaging, I do believe he would be an excellent candidate for this procedure.             [1]   Past Medical History:  Diagnosis Date    Personal history of nicotine dependence 10/05/2020    History of nicotine dependence   [2] No past surgical history on file.  [3] No family history on file.  [4]   Social History  Tobacco Use    Smoking status: Never    Smokeless tobacco: Never   Substance Use Topics    Alcohol use: Yes     Comment: 2-3 drinks per month    Drug use: Never   [5]   Current Outpatient Medications on File Prior to Visit   Medication Sig Dispense Refill    albuterol (ProAir HFA) 90 mcg/actuation inhaler Inhale 2 puffs every 6 hours if needed for wheezing or shortness of breath. 18 g 3    diazePAM (Valium) 5 mg tablet Take 1 to 2 tablets an hour before your MRI. 2 tablet 0    ipratropium-albuteroL (Duo-Neb) 0.5-2.5 mg/3 mL nebulizer solution Take 3 mL by nebulization every 4 hours if needed for shortness of breath or wheezing. 180 mL 1    methocarbamol (Robaxin) 500 mg tablet Take 1 tablet (500 mg) by mouth 3 times a day as needed for muscle spasms. 90 tablet 1    mometasone-formoterol (Dulera) 200-5 mcg/actuation  inhaler Inhale 2 puffs 2 times a day. 13 g 3    [] naproxen (Naprosyn) 500 mg tablet Take 1 tablet (500 mg) by mouth 2 times a day as needed for moderate pain (4 - 6). 60 tablet 0    ondansetron ODT (Zofran-ODT) 4 mg disintegrating tablet Take 1 tablet (4 mg) by mouth every 8 hours if needed for nausea or vomiting. 15 tablet 0    predniSONE (Deltasone) 10 mg tablet Please take after breakfast: 4 TABS X 2 DAYS, 3 TABS X  2 DAYS, 2 TABS X 2 DAYS, 1 TAB  X  7 Days 25 tablet 0     No current facility-administered medications on file prior to visit.   [6]   Allergies  Allergen Reactions    Penicillins Anaphylaxis    Sulfa (Sulfonamide Antibiotics) Anaphylaxis and Hives

## 2025-08-07 ENCOUNTER — PREP FOR PROCEDURE (OUTPATIENT)
Dept: PAIN MEDICINE | Facility: HOSPITAL | Age: 40
End: 2025-08-07
Payer: COMMERCIAL

## 2025-08-07 DIAGNOSIS — M54.51 VERTEBROGENIC LOW BACK PAIN: Primary | ICD-10-CM

## 2025-08-20 ENCOUNTER — APPOINTMENT (OUTPATIENT)
Dept: DERMATOLOGY | Facility: CLINIC | Age: 40
End: 2025-08-20
Payer: COMMERCIAL

## 2025-08-20 DIAGNOSIS — L30.1 ECZEMA, DYSHIDROTIC: Primary | ICD-10-CM

## 2025-08-20 PROCEDURE — 99204 OFFICE O/P NEW MOD 45 MIN: CPT | Performed by: DERMATOLOGY

## 2025-08-20 RX ORDER — CLOBETASOL PROPIONATE 0.5 MG/G
OINTMENT TOPICAL
Qty: 60 G | Refills: 3 | Status: SHIPPED | OUTPATIENT
Start: 2025-08-20

## 2025-08-25 ENCOUNTER — HOSPITAL ENCOUNTER (OUTPATIENT)
Facility: HOSPITAL | Age: 40
Discharge: HOME | End: 2025-08-25
Payer: COMMERCIAL

## 2025-08-25 VITALS
SYSTOLIC BLOOD PRESSURE: 144 MMHG | DIASTOLIC BLOOD PRESSURE: 71 MMHG | HEART RATE: 64 BPM | TEMPERATURE: 98.1 F | OXYGEN SATURATION: 100 % | RESPIRATION RATE: 16 BRPM

## 2025-08-25 DIAGNOSIS — M54.51 VERTEBROGENIC LOW BACK PAIN: ICD-10-CM

## 2025-08-25 PROCEDURE — 64483 NJX AA&/STRD TFRM EPI L/S 1: CPT | Mod: 50 | Performed by: ANESTHESIOLOGY

## 2025-08-25 PROCEDURE — 2550000001 HC RX 255 CONTRASTS: Performed by: ANESTHESIOLOGY

## 2025-08-25 PROCEDURE — 2720000007 HC OR 272 NO HCPCS

## 2025-08-25 PROCEDURE — 7100000009 HC PHASE TWO TIME - INITIAL BASE CHARGE

## 2025-08-25 PROCEDURE — 2500000004 HC RX 250 GENERAL PHARMACY W/ HCPCS (ALT 636 FOR OP/ED): Performed by: ANESTHESIOLOGY

## 2025-08-25 PROCEDURE — 64483 NJX AA&/STRD TFRM EPI L/S 1: CPT | Performed by: ANESTHESIOLOGY

## 2025-08-25 PROCEDURE — 7100000010 HC PHASE TWO TIME - EACH INCREMENTAL 1 MINUTE

## 2025-08-25 RX ORDER — LIDOCAINE HYDROCHLORIDE 5 MG/ML
INJECTION, SOLUTION INFILTRATION; INTRAVENOUS AS NEEDED
Status: COMPLETED | OUTPATIENT
Start: 2025-08-25 | End: 2025-08-25

## 2025-08-25 RX ORDER — DEXAMETHASONE SODIUM PHOSPHATE 10 MG/ML
INJECTION INTRAMUSCULAR; INTRAVENOUS AS NEEDED
Status: COMPLETED | OUTPATIENT
Start: 2025-08-25 | End: 2025-08-25

## 2025-08-25 RX ADMIN — IOHEXOL 2 ML: 240 INJECTION, SOLUTION INTRATHECAL; INTRAVASCULAR; INTRAVENOUS; ORAL at 11:16

## 2025-08-25 RX ADMIN — LIDOCAINE HYDROCHLORIDE 12 ML: 5 INJECTION, SOLUTION INFILTRATION at 11:10

## 2025-08-25 RX ADMIN — DEXAMETHASONE SODIUM PHOSPHATE 10 MG: 10 INJECTION, SOLUTION INTRAMUSCULAR; INTRAVENOUS at 11:17

## 2025-08-25 ASSESSMENT — PAIN - FUNCTIONAL ASSESSMENT
PAIN_FUNCTIONAL_ASSESSMENT: 0-10
PAIN_FUNCTIONAL_ASSESSMENT: WONG-BAKER FACES
PAIN_FUNCTIONAL_ASSESSMENT: 0-10
PAIN_FUNCTIONAL_ASSESSMENT: WONG-BAKER FACES

## 2025-08-25 ASSESSMENT — PAIN SCALES - GENERAL
PAINLEVEL_OUTOF10: 9
PAINLEVEL_OUTOF10: 9
PAINLEVEL_OUTOF10: 0 - NO PAIN
PAINLEVEL_OUTOF10: 10 - WORST POSSIBLE PAIN

## 2025-08-25 ASSESSMENT — PAIN DESCRIPTION - DESCRIPTORS: DESCRIPTORS: SHARP;STABBING;THROBBING;ACHING
